# Patient Record
Sex: MALE | Race: WHITE | NOT HISPANIC OR LATINO | URBAN - METROPOLITAN AREA
[De-identification: names, ages, dates, MRNs, and addresses within clinical notes are randomized per-mention and may not be internally consistent; named-entity substitution may affect disease eponyms.]

---

## 2023-01-26 PROBLEM — M54.12 RIGHT CERVICAL RADICULOPATHY: Status: ACTIVE | Noted: 2023-01-26

## 2023-01-26 PROBLEM — E78.5 HYPERLIPIDEMIA: Status: ACTIVE | Noted: 2023-01-26

## 2023-01-26 PROBLEM — R03.0 ELEVATED BLOOD PRESSURE READING: Status: ACTIVE | Noted: 2023-01-26

## 2023-01-26 PROBLEM — G47.00 INSOMNIA: Status: ACTIVE | Noted: 2023-01-26

## 2023-01-26 PROBLEM — R53.83 LACK OF ENERGY: Status: ACTIVE | Noted: 2023-01-26

## 2023-01-26 PROBLEM — L65.9 THINNING HAIR: Status: ACTIVE | Noted: 2023-01-26

## 2023-01-26 PROBLEM — E55.9 HYPOVITAMINOSIS D: Status: ACTIVE | Noted: 2023-01-26

## 2023-01-26 PROBLEM — L70.0 ACNE VULGARIS: Status: ACTIVE | Noted: 2023-01-26

## 2023-01-26 PROBLEM — S40.011A CONTUSION OF SHOULDER, RIGHT: Status: ACTIVE | Noted: 2023-01-26

## 2023-01-26 PROBLEM — F41.9 ANXIETY: Status: ACTIVE | Noted: 2023-01-26

## 2023-01-26 PROBLEM — M25.511 SHOULDER PAIN, RIGHT: Status: ACTIVE | Noted: 2023-01-26

## 2023-01-26 RX ORDER — METHYLPREDNISOLONE 4 MG/1
TABLET ORAL
COMMUNITY
Start: 2022-08-08 | End: 2023-03-10

## 2023-01-26 RX ORDER — BACLOFEN 10 MG/1
10 TABLET ORAL 2 TIMES DAILY
COMMUNITY
Start: 2022-08-04 | End: 2023-03-10

## 2023-01-26 RX ORDER — FINASTERIDE 1 MG/1
1 TABLET, FILM COATED ORAL DAILY
COMMUNITY
Start: 2022-07-15 | End: 2023-08-18

## 2023-01-26 RX ORDER — TRAZODONE HYDROCHLORIDE 50 MG/1
50 TABLET ORAL NIGHTLY PRN
COMMUNITY
Start: 2021-07-22 | End: 2023-03-10

## 2023-01-26 RX ORDER — METHOCARBAMOL 500 MG/1
TABLET, FILM COATED ORAL DAILY PRN
COMMUNITY
Start: 2022-08-08 | End: 2023-03-10

## 2023-01-26 RX ORDER — ATORVASTATIN CALCIUM 20 MG/1
20 TABLET, FILM COATED ORAL NIGHTLY
COMMUNITY
Start: 2022-07-27 | End: 2023-03-10 | Stop reason: SDUPTHER

## 2023-03-10 ENCOUNTER — OFFICE VISIT (OUTPATIENT)
Dept: PRIMARY CARE | Facility: CLINIC | Age: 38
End: 2023-03-10
Payer: COMMERCIAL

## 2023-03-10 VITALS
WEIGHT: 234 LBS | OXYGEN SATURATION: 98 % | TEMPERATURE: 97.4 F | SYSTOLIC BLOOD PRESSURE: 118 MMHG | DIASTOLIC BLOOD PRESSURE: 84 MMHG | BODY MASS INDEX: 31.01 KG/M2 | HEART RATE: 80 BPM | RESPIRATION RATE: 18 BRPM | HEIGHT: 73 IN

## 2023-03-10 DIAGNOSIS — G47.00 INSOMNIA, UNSPECIFIED TYPE: ICD-10-CM

## 2023-03-10 DIAGNOSIS — F41.9 ANXIETY: ICD-10-CM

## 2023-03-10 DIAGNOSIS — E78.5 HYPERLIPIDEMIA, UNSPECIFIED HYPERLIPIDEMIA TYPE: ICD-10-CM

## 2023-03-10 DIAGNOSIS — Z00.00 HEALTHCARE MAINTENANCE: Primary | ICD-10-CM

## 2023-03-10 LAB
POC APPEARANCE, URINE: CLEAR
POC BILIRUBIN, URINE: NEGATIVE
POC BLOOD, URINE: NEGATIVE
POC COLOR, URINE: YELLOW
POC GLUCOSE, URINE: NEGATIVE MG/DL
POC KETONES, URINE: NEGATIVE MG/DL
POC LEUKOCYTES, URINE: NEGATIVE
POC NITRITE,URINE: NEGATIVE
POC PH, URINE: 6 PH
POC PROTEIN, URINE: NEGATIVE MG/DL
POC SPECIFIC GRAVITY, URINE: 1.01
POC UROBILINOGEN, URINE: 0.2 EU/DL

## 2023-03-10 PROCEDURE — 99395 PREV VISIT EST AGE 18-39: CPT | Performed by: FAMILY MEDICINE

## 2023-03-10 PROCEDURE — 99214 OFFICE O/P EST MOD 30 MIN: CPT | Performed by: FAMILY MEDICINE

## 2023-03-10 PROCEDURE — 1036F TOBACCO NON-USER: CPT | Performed by: FAMILY MEDICINE

## 2023-03-10 PROCEDURE — 81002 URINALYSIS NONAUTO W/O SCOPE: CPT | Performed by: FAMILY MEDICINE

## 2023-03-10 RX ORDER — ZOLPIDEM TARTRATE 6.25 MG/1
6.25 TABLET, FILM COATED, EXTENDED RELEASE ORAL NIGHTLY PRN
Qty: 30 TABLET | Refills: 0 | Status: SHIPPED | OUTPATIENT
Start: 2023-03-10 | End: 2024-03-11 | Stop reason: SDUPTHER

## 2023-03-10 RX ORDER — BUSPIRONE HYDROCHLORIDE 5 MG/1
TABLET ORAL
Qty: 60 TABLET | Refills: 0 | Status: SHIPPED | OUTPATIENT
Start: 2023-03-10 | End: 2023-04-03

## 2023-03-10 RX ORDER — ATORVASTATIN CALCIUM 20 MG/1
20 TABLET, FILM COATED ORAL NIGHTLY
Qty: 90 TABLET | Refills: 3 | Status: SHIPPED | OUTPATIENT
Start: 2023-03-10 | End: 2023-07-25

## 2023-03-10 ASSESSMENT — ENCOUNTER SYMPTOMS
HEADACHES: 0
SHORTNESS OF BREATH: 0

## 2023-03-10 NOTE — PROGRESS NOTES
"Subjective     Ravi Subramanian is a 37 y.o. male who presents for Annual Exam (Pt. In for a complete physical.).    HPI     Pt feels well.  He had recent cmp and lipids done a few months ago, lipids improved on statin therapy.  No side effects to statin.  He does have insomnia and failed trazodone.  He is requesting ambien which has helped in the past.  He only needs it 1-2 times a month.  He also reports situational anxiety , tried hydroxyzine which was ineffective.  Patient denies chest pain, shortness of breath, dizziness, headaches or vision changes.      Review of Systems   Respiratory:  Negative for shortness of breath.    Cardiovascular:  Negative for chest pain.   Neurological:  Negative for headaches.       Objective   /84 (BP Location: Right arm, Patient Position: Sitting)   Pulse 80   Temp 36.3 °C (97.4 °F) (Temporal)   Resp 18   Ht 1.854 m (6' 1\")   Wt 106 kg (234 lb)   SpO2 98%   BMI 30.87 kg/m²     Past Surgical History:   Procedure Laterality Date    HERNIA REPAIR  07/22/2021    Hernia Repair    WISDOM TOOTH EXTRACTION          Social History     Socioeconomic History    Marital status: Single     Spouse name: Not on file    Number of children: 0    Years of education: Not on file    Highest education level: Not on file   Occupational History    Occupation:    Tobacco Use    Smoking status: Never     Passive exposure: Never    Smokeless tobacco: Never   Vaping Use    Vaping status: Never Used     Passive vaping exposure: Yes   Substance and Sexual Activity    Alcohol use: Yes     Alcohol/week: 1.0 standard drink of alcohol     Types: 1 Standard drinks or equivalent per week    Drug use: Never    Sexual activity: Not on file   Other Topics Concern    Not on file   Social History Narrative    Not on file     Social Determinants of Health     Financial Resource Strain: Not on file   Food Insecurity: Not on file   Transportation Needs: Not on file   Physical Activity: Not on file "   Stress: Not on file   Social Connections: Not on file   Intimate Partner Violence: Not on file   Housing Stability: Not on file        Family History   Problem Relation Name Age of Onset    Arthritis Mother      Sleep apnea Father      Anxiety disorder Brother      Anxiety disorder Maternal Grandmother      Prostate cancer Maternal Grandfather      COPD Other grandmother         Immunization History   Administered Date(s) Administered    Influenza, seasonal, injectable 11/08/2021    Moderna SARS-CoV-2 Vaccination 11/11/2021    Pfizer Purple Cap SARS-CoV-2 03/18/2021, 04/09/2021    Pfizer Sars-cov-2 Bivalent 30 mcg/0.3 mL 09/07/2022    Tdap 07/22/2021        Physical Exam  Vitals reviewed.   Constitutional:       General: He is not in acute distress.     Appearance: Normal appearance.   HENT:      Head: Normocephalic and atraumatic.      Right Ear: Tympanic membrane and ear canal normal.      Left Ear: Tympanic membrane and ear canal normal.      Nose: Nose normal.      Mouth/Throat:      Mouth: Mucous membranes are moist.      Pharynx: Oropharynx is clear. No oropharyngeal exudate or posterior oropharyngeal erythema.   Eyes:      Extraocular Movements: Extraocular movements intact.      Conjunctiva/sclera: Conjunctivae normal.   Cardiovascular:      Rate and Rhythm: Normal rate and regular rhythm.      Heart sounds: No murmur heard.  Pulmonary:      Effort: Pulmonary effort is normal. No respiratory distress.      Breath sounds: Normal breath sounds. No wheezing, rhonchi or rales.   Abdominal:      General: Abdomen is flat. There is no distension.      Palpations: Abdomen is soft.      Tenderness: There is no abdominal tenderness.   Genitourinary:     Testes: Normal.   Musculoskeletal:         General: Normal range of motion.      Cervical back: Normal range of motion and neck supple.   Lymphadenopathy:      Cervical: No cervical adenopathy.   Skin:     General: Skin is warm and dry.   Neurological:      General:  No focal deficit present.      Mental Status: He is alert and oriented to person, place, and time. Mental status is at baseline.   Psychiatric:         Mood and Affect: Mood normal.         Behavior: Behavior normal.         Problem List Items Addressed This Visit       Anxiety    Hyperlipidemia    Relevant Medications    atorvastatin (Lipitor) 20 mg tablet    Other Relevant Orders    Lipid Panel    Hemoglobin A1C    Insomnia     Other Visit Diagnoses       Healthcare maintenance    -  Primary    Relevant Orders    POCT UA (nonautomated w/o microscopy) manually resulted (Completed)    Comprehensive Metabolic Panel    Lipid Panel    Hemoglobin A1C    CBC            Assessment/Plan     Well exam     HLD - controlled on statin therapy    Reviewed lipids     Insomnia - failed trazodone, pt has tried ambien which has been helpflul in the past, will rx short dose as needed, pt only has trouble sleeping 1-2 times a month    I have personally reviewed the OARRS report today for this patient and it is consistent with the prescribed therapy.  We weighed the risks and benefits of this therapy.  I have considered the risk of abuse, dependence, addiction, and diversion.  I will continue with the current treatment plan     Anxiety - consider buspar 5 mg BID as needed    Follow up in 1-2 months

## 2023-04-01 DIAGNOSIS — F41.9 ANXIETY: ICD-10-CM

## 2023-04-03 RX ORDER — BUSPIRONE HYDROCHLORIDE 5 MG/1
TABLET ORAL
Qty: 60 TABLET | Refills: 3 | Status: SHIPPED | OUTPATIENT
Start: 2023-04-03 | End: 2023-06-26

## 2023-06-24 DIAGNOSIS — F41.9 ANXIETY: ICD-10-CM

## 2023-06-26 RX ORDER — BUSPIRONE HYDROCHLORIDE 5 MG/1
TABLET ORAL
Qty: 180 TABLET | Refills: 0 | Status: SHIPPED | OUTPATIENT
Start: 2023-06-26 | End: 2023-07-25

## 2023-07-20 ENCOUNTER — LAB (OUTPATIENT)
Dept: LAB | Facility: LAB | Age: 38
End: 2023-07-20
Payer: COMMERCIAL

## 2023-07-20 DIAGNOSIS — Z00.00 HEALTHCARE MAINTENANCE: ICD-10-CM

## 2023-07-20 DIAGNOSIS — E78.5 HYPERLIPIDEMIA, UNSPECIFIED HYPERLIPIDEMIA TYPE: ICD-10-CM

## 2023-07-20 LAB
ALANINE AMINOTRANSFERASE (SGPT) (U/L) IN SER/PLAS: 14 U/L (ref 10–52)
ALBUMIN (G/DL) IN SER/PLAS: 4.7 G/DL (ref 3.4–5)
ALKALINE PHOSPHATASE (U/L) IN SER/PLAS: 68 U/L (ref 33–120)
ANION GAP IN SER/PLAS: 12 MMOL/L (ref 10–20)
ASPARTATE AMINOTRANSFERASE (SGOT) (U/L) IN SER/PLAS: 17 U/L (ref 9–39)
BILIRUBIN TOTAL (MG/DL) IN SER/PLAS: 0.8 MG/DL (ref 0–1.2)
CALCIUM (MG/DL) IN SER/PLAS: 10.4 MG/DL (ref 8.6–10.3)
CARBON DIOXIDE, TOTAL (MMOL/L) IN SER/PLAS: 31 MMOL/L (ref 21–32)
CHLORIDE (MMOL/L) IN SER/PLAS: 101 MMOL/L (ref 98–107)
CHOLESTEROL (MG/DL) IN SER/PLAS: 172 MG/DL (ref 0–199)
CHOLESTEROL IN HDL (MG/DL) IN SER/PLAS: 43.1 MG/DL
CHOLESTEROL/HDL RATIO: 4
CREATININE (MG/DL) IN SER/PLAS: 1.39 MG/DL (ref 0.5–1.3)
ERYTHROCYTE DISTRIBUTION WIDTH (RATIO) BY AUTOMATED COUNT: 11.9 % (ref 11.5–14.5)
ERYTHROCYTE MEAN CORPUSCULAR HEMOGLOBIN CONCENTRATION (G/DL) BY AUTOMATED: 32.6 G/DL (ref 32–36)
ERYTHROCYTE MEAN CORPUSCULAR VOLUME (FL) BY AUTOMATED COUNT: 94 FL (ref 80–100)
ERYTHROCYTES (10*6/UL) IN BLOOD BY AUTOMATED COUNT: 5.04 X10E12/L (ref 4.5–5.9)
ESTIMATED AVERAGE GLUCOSE FOR HBA1C: 100 MG/DL
GFR MALE: 67 ML/MIN/1.73M2
GLUCOSE (MG/DL) IN SER/PLAS: 89 MG/DL (ref 74–99)
HEMATOCRIT (%) IN BLOOD BY AUTOMATED COUNT: 47.5 % (ref 41–52)
HEMOGLOBIN (G/DL) IN BLOOD: 15.5 G/DL (ref 13.5–17.5)
HEMOGLOBIN A1C/HEMOGLOBIN TOTAL IN BLOOD: 5.1 %
LDL: 113 MG/DL (ref 0–99)
LEUKOCYTES (10*3/UL) IN BLOOD BY AUTOMATED COUNT: 7.2 X10E9/L (ref 4.4–11.3)
PLATELETS (10*3/UL) IN BLOOD AUTOMATED COUNT: 310 X10E9/L (ref 150–450)
POTASSIUM (MMOL/L) IN SER/PLAS: 5 MMOL/L (ref 3.5–5.3)
PROTEIN TOTAL: 7.2 G/DL (ref 6.4–8.2)
SODIUM (MMOL/L) IN SER/PLAS: 139 MMOL/L (ref 136–145)
TRIGLYCERIDE (MG/DL) IN SER/PLAS: 78 MG/DL (ref 0–149)
UREA NITROGEN (MG/DL) IN SER/PLAS: 16 MG/DL (ref 6–23)
VLDL: 16 MG/DL (ref 0–40)

## 2023-07-20 PROCEDURE — 80053 COMPREHEN METABOLIC PANEL: CPT

## 2023-07-20 PROCEDURE — 36415 COLL VENOUS BLD VENIPUNCTURE: CPT

## 2023-07-20 PROCEDURE — 83036 HEMOGLOBIN GLYCOSYLATED A1C: CPT

## 2023-07-20 PROCEDURE — 80061 LIPID PANEL: CPT

## 2023-07-20 PROCEDURE — 85027 COMPLETE CBC AUTOMATED: CPT

## 2023-07-25 ENCOUNTER — OFFICE VISIT (OUTPATIENT)
Dept: PRIMARY CARE | Facility: CLINIC | Age: 38
End: 2023-07-25
Payer: COMMERCIAL

## 2023-07-25 VITALS
SYSTOLIC BLOOD PRESSURE: 124 MMHG | TEMPERATURE: 97.9 F | DIASTOLIC BLOOD PRESSURE: 84 MMHG | RESPIRATION RATE: 18 BRPM | OXYGEN SATURATION: 98 % | BODY MASS INDEX: 28.23 KG/M2 | WEIGHT: 213 LBS | HEIGHT: 73 IN | HEART RATE: 77 BPM

## 2023-07-25 DIAGNOSIS — R79.89 ELEVATED SERUM CREATININE: ICD-10-CM

## 2023-07-25 DIAGNOSIS — F41.9 ANXIETY: ICD-10-CM

## 2023-07-25 DIAGNOSIS — E78.5 HYPERLIPIDEMIA, UNSPECIFIED HYPERLIPIDEMIA TYPE: Primary | ICD-10-CM

## 2023-07-25 DIAGNOSIS — G47.00 INSOMNIA, UNSPECIFIED TYPE: ICD-10-CM

## 2023-07-25 DIAGNOSIS — L70.9 ACNE, UNSPECIFIED ACNE TYPE: ICD-10-CM

## 2023-07-25 DIAGNOSIS — F90.0 ATTENTION DEFICIT HYPERACTIVITY DISORDER (ADHD), PREDOMINANTLY INATTENTIVE TYPE: ICD-10-CM

## 2023-07-25 PROCEDURE — 99214 OFFICE O/P EST MOD 30 MIN: CPT | Performed by: FAMILY MEDICINE

## 2023-07-25 PROCEDURE — 1036F TOBACCO NON-USER: CPT | Performed by: FAMILY MEDICINE

## 2023-07-25 RX ORDER — DEXTROAMPHETAMINE SACCHARATE, AMPHETAMINE ASPARTATE, DEXTROAMPHETAMINE SULFATE AND AMPHETAMINE SULFATE 1.25; 1.25; 1.25; 1.25 MG/1; MG/1; MG/1; MG/1
1 TABLET ORAL 2 TIMES DAILY
Qty: 60 TABLET | Refills: 0 | COMMUNITY
Start: 2023-07-09 | End: 2023-11-10 | Stop reason: ALTCHOICE

## 2023-07-25 RX ORDER — DEXTROAMPHETAMINE SACCHARATE, AMPHETAMINE ASPARTATE, DEXTROAMPHETAMINE SULFATE AND AMPHETAMINE SULFATE 7.5; 7.5; 7.5; 7.5 MG/1; MG/1; MG/1; MG/1
1 TABLET ORAL 2 TIMES DAILY
Qty: 60 TABLET | Refills: 0 | COMMUNITY
Start: 2023-07-09 | End: 2023-08-08

## 2023-07-25 RX ORDER — MINOCYCLINE HYDROCHLORIDE 100 MG/1
100 CAPSULE ORAL 2 TIMES DAILY
Qty: 60 CAPSULE | Refills: 1 | Status: SHIPPED | OUTPATIENT
Start: 2023-07-25 | End: 2023-10-17

## 2023-07-25 RX ORDER — ATORVASTATIN CALCIUM 40 MG/1
40 TABLET, FILM COATED ORAL DAILY
Qty: 90 TABLET | Refills: 1 | Status: SHIPPED | OUTPATIENT
Start: 2023-07-25 | End: 2024-01-18

## 2023-07-25 ASSESSMENT — ENCOUNTER SYMPTOMS
HEADACHES: 0
SHORTNESS OF BREATH: 0

## 2023-07-25 NOTE — PROGRESS NOTES
"Subjective     Ravi Subramanian is a 38 y.o. male who presents for Anxiety.    Anxiety  Patient reports no chest pain or shortness of breath.          Pt has HLD, on atorvastatin 20 mg daily.  He had recent labs done.     He has anxiety and tried buspar 5 mg BID but didn't take it long. He didn't feel it helped.      His creatinine was elevated on his recent labwork.  He does admit to taking creatine prior to workouts.  He also admits that he doesn't drink enough fluids.      Hx of ADHD, diagnosed by psychiatry (telehealth) - on adderall 20 mg and adderall 5 mg BID.  He is doing well.     Pt also reports recent acne flare up on face.  He is requesting treatment. He has never tried tetracycline antibiotics.      Review of Systems   Respiratory:  Negative for shortness of breath.    Cardiovascular:  Negative for chest pain.   Neurological:  Negative for headaches.       Objective     Vitals:    07/25/23 0828   BP: 124/84   BP Location: Right arm   Patient Position: Sitting   Pulse: 77   Resp: 18   Temp: 36.6 °C (97.9 °F)   TempSrc: Temporal   SpO2: 98%   Weight: 96.6 kg (213 lb)   Height: 1.854 m (6' 1\")        Current Outpatient Medications   Medication Instructions    amphetamine-dextroamphetamine (Adderall) 20 mg tablet 1 tablet, oral, 2 times daily    amphetamine-dextroamphetamine (Adderall) 5 mg tablet 1 tablet, oral, 2 times daily    atorvastatin (LIPITOR) 40 mg, oral, Daily    finasteride (Propecia) 1 mg tablet 1 tablet, oral, Daily    minocycline 100 mg, oral, 2 times daily    zolpidem CR (AMBIEN CR) 6.25 mg, oral, Nightly PRN, Do not crush, chew, or split.        Past Surgical History:   Procedure Laterality Date    HERNIA REPAIR  07/22/2021    Hernia Repair    WISDOM TOOTH EXTRACTION          Social History     Tobacco Use    Smoking status: Never     Passive exposure: Never    Smokeless tobacco: Never   Vaping Use    Vaping Use: Never used   Substance Use Topics    Alcohol use: Yes     Alcohol/week: 1.0 " standard drink of alcohol     Types: 1 Standard drinks or equivalent per week    Drug use: Never        Family History   Problem Relation Name Age of Onset    Arthritis Mother      Sleep apnea Father      Anxiety disorder Brother      Anxiety disorder Maternal Grandmother      Prostate cancer Maternal Grandfather      COPD Other grandmother         Immunization History   Administered Date(s) Administered    Influenza, injectable, MDCK, preservative free, quadrivalent 11/08/2021    Influenza, injectable, quadrivalent, preservative free 11/13/2020, 10/19/2022    Influenza, seasonal, injectable 11/08/2021    Moderna SARS-CoV-2 Vaccination 11/11/2021    Pfizer Purple Cap SARS-CoV-2 03/18/2021, 04/09/2021    Pfizer Sars-cov-2 Bivalent 30 mcg/0.3 mL 09/07/2022    Tdap 07/22/2021        Physical Exam  Vitals reviewed.   Constitutional:       General: He is not in acute distress.     Appearance: Normal appearance. He is well-developed.   HENT:      Head: Normocephalic and atraumatic.      Nose: Nose normal.   Eyes:      General: Lids are normal.      Extraocular Movements: Extraocular movements intact.      Conjunctiva/sclera: Conjunctivae normal.      Right eye: Right conjunctiva is not injected.      Left eye: Left conjunctiva is not injected.   Cardiovascular:      Rate and Rhythm: Normal rate and regular rhythm.      Heart sounds: No murmur heard.  Pulmonary:      Effort: Pulmonary effort is normal. No respiratory distress.      Breath sounds: Normal breath sounds. No wheezing, rhonchi or rales.   Lymphadenopathy:      Cervical: No cervical adenopathy.   Skin:     General: Skin is warm and dry.      Findings: Acne (lower chin region) present. No rash.   Neurological:      Mental Status: He is alert and oriented to person, place, and time. Mental status is at baseline.   Psychiatric:         Mood and Affect: Mood normal.         Behavior: Behavior normal.         Problem List Items Addressed This Visit       Anxiety     Hyperlipidemia - Primary    Relevant Medications    atorvastatin (Lipitor) 40 mg tablet    Other Relevant Orders    Lipid Panel    Insomnia    Attention deficit hyperactivity disorder (ADHD), predominantly inattentive type     Other Visit Diagnoses       Elevated serum creatinine        Relevant Orders    Basic Metabolic Panel    Acne, unspecified acne type        Relevant Medications    minocycline 100 mg capsule            Assessment/Plan     Anxiety - failed  buspar , anxiety levels controlled without medication.     Insomnia - controlled  - ambien as needed.      HLD - not adequately controlled, increase to 40 mg atorvastatin daily, recheck lipids in 3 months     Elevated creatinine - recheck bmp in 1 month , stop using creatine.     ADHD - on adderall through telehealth psychiatry    [facility] reviewed    Acne - face - chin region - will prescribe minocycline 100 mg BID x 4 weeks , then as needed for flare ups     Follow up 6 months or sooner if needed

## 2023-08-18 DIAGNOSIS — L65.9 NONSCARRING HAIR LOSS, UNSPECIFIED: ICD-10-CM

## 2023-08-18 RX ORDER — FINASTERIDE 1 MG/1
1 TABLET, FILM COATED ORAL DAILY
Qty: 90 TABLET | Refills: 3 | Status: SHIPPED | OUTPATIENT
Start: 2023-08-18

## 2023-09-14 ENCOUNTER — LAB (OUTPATIENT)
Dept: LAB | Facility: LAB | Age: 38
End: 2023-09-14
Payer: COMMERCIAL

## 2023-09-14 DIAGNOSIS — R79.89 ELEVATED SERUM CREATININE: ICD-10-CM

## 2023-09-14 DIAGNOSIS — E78.5 HYPERLIPIDEMIA, UNSPECIFIED HYPERLIPIDEMIA TYPE: ICD-10-CM

## 2023-09-14 DIAGNOSIS — E78.5 HYPERLIPIDEMIA, UNSPECIFIED HYPERLIPIDEMIA TYPE: Primary | ICD-10-CM

## 2023-09-14 LAB
ANION GAP IN SER/PLAS: 13 MMOL/L (ref 10–20)
CALCIUM (MG/DL) IN SER/PLAS: 10 MG/DL (ref 8.6–10.6)
CARBON DIOXIDE, TOTAL (MMOL/L) IN SER/PLAS: 29 MMOL/L (ref 21–32)
CHLORIDE (MMOL/L) IN SER/PLAS: 104 MMOL/L (ref 98–107)
CHOLESTEROL (MG/DL) IN SER/PLAS: 155 MG/DL (ref 0–199)
CHOLESTEROL IN HDL (MG/DL) IN SER/PLAS: 52.2 MG/DL
CHOLESTEROL/HDL RATIO: 3
CREATININE (MG/DL) IN SER/PLAS: 1.28 MG/DL (ref 0.5–1.3)
GFR MALE: 74 ML/MIN/1.73M2
GLUCOSE (MG/DL) IN SER/PLAS: 95 MG/DL (ref 74–99)
LDL: 90 MG/DL (ref 0–99)
POTASSIUM (MMOL/L) IN SER/PLAS: 4.7 MMOL/L (ref 3.5–5.3)
SODIUM (MMOL/L) IN SER/PLAS: 141 MMOL/L (ref 136–145)
TRIGLYCERIDE (MG/DL) IN SER/PLAS: 66 MG/DL (ref 0–149)
UREA NITROGEN (MG/DL) IN SER/PLAS: 19 MG/DL (ref 6–23)
VLDL: 13 MG/DL (ref 0–40)

## 2023-09-14 PROCEDURE — 80048 BASIC METABOLIC PNL TOTAL CA: CPT

## 2023-09-14 PROCEDURE — 36415 COLL VENOUS BLD VENIPUNCTURE: CPT

## 2023-09-14 PROCEDURE — 80061 LIPID PANEL: CPT

## 2023-10-16 DIAGNOSIS — L70.9 ACNE, UNSPECIFIED ACNE TYPE: ICD-10-CM

## 2023-10-17 ENCOUNTER — OFFICE VISIT (OUTPATIENT)
Dept: PRIMARY CARE | Facility: CLINIC | Age: 38
End: 2023-10-17
Payer: COMMERCIAL

## 2023-10-17 VITALS
SYSTOLIC BLOOD PRESSURE: 124 MMHG | OXYGEN SATURATION: 97 % | WEIGHT: 226 LBS | TEMPERATURE: 97.7 F | HEIGHT: 73 IN | HEART RATE: 74 BPM | RESPIRATION RATE: 18 BRPM | BODY MASS INDEX: 29.95 KG/M2 | DIASTOLIC BLOOD PRESSURE: 82 MMHG

## 2023-10-17 DIAGNOSIS — M25.512 LEFT SHOULDER PAIN, UNSPECIFIED CHRONICITY: Primary | ICD-10-CM

## 2023-10-17 PROCEDURE — 99213 OFFICE O/P EST LOW 20 MIN: CPT | Performed by: FAMILY MEDICINE

## 2023-10-17 PROCEDURE — 1036F TOBACCO NON-USER: CPT | Performed by: FAMILY MEDICINE

## 2023-10-17 RX ORDER — MINOCYCLINE HYDROCHLORIDE 100 MG/1
100 CAPSULE ORAL 2 TIMES DAILY
Qty: 60 CAPSULE | Refills: 0 | Status: SHIPPED | OUTPATIENT
Start: 2023-10-17 | End: 2023-11-20

## 2023-10-17 NOTE — PROGRESS NOTES
"Subjective     Ravi Subramanian is a 38 y.o. male who presents for Left Shoulder Pain (Per patient, he believes he injured his left shoulder weight lifting. Patient states the pain has increased over the last few months. ).    HPI     Left anterior shoulder pain x 2 months, unclear injury.  Pain with lateral raise/abduction or overhead activity with bearing weight.  No known hx of left shoulder injury.  No hand or arm numbness or tingling.  No weakness of left arm/shoulder.          Objective     Vitals:    10/17/23 1448   BP: 124/82   BP Location: Left arm   Patient Position: Sitting   Pulse: 74   Resp: 18   Temp: 36.5 °C (97.7 °F)   TempSrc: Temporal   SpO2: 97%   Weight: 103 kg (226 lb)   Height: 1.854 m (6' 1\")        Current Outpatient Medications   Medication Instructions    amphetamine-dextroamphetamine (Adderall) 30 mg tablet 1 tablet, oral, 2 times daily    amphetamine-dextroamphetamine (Adderall) 5 mg tablet 1 tablet, oral, 2 times daily    atorvastatin (LIPITOR) 40 mg, oral, Daily    finasteride (PROPECIA) 1 mg, oral, Daily    minocycline 100 mg, oral, 2 times daily    zolpidem CR (AMBIEN CR) 6.25 mg, oral, Nightly PRN, Do not crush, chew, or split.        Past Surgical History:   Procedure Laterality Date    HERNIA REPAIR  07/22/2021    Hernia Repair    WISDOM TOOTH EXTRACTION          Social History     Tobacco Use    Smoking status: Never     Passive exposure: Never    Smokeless tobacco: Never   Vaping Use    Vaping Use: Never used   Substance Use Topics    Alcohol use: Yes     Alcohol/week: 1.0 standard drink of alcohol     Types: 1 Standard drinks or equivalent per week    Drug use: Never        Family History   Problem Relation Name Age of Onset    Arthritis Mother      Sleep apnea Father      Anxiety disorder Brother      Anxiety disorder Maternal Grandmother      Prostate cancer Maternal Grandfather      COPD Other grandmother         Immunization History   Administered Date(s) Administered    Flu " vaccine (IIV4), preservative free *Check age/dose* 11/13/2020, 10/19/2022    Influenza, injectable, MDCK, preservative free, quadrivalent 11/08/2021    Influenza, seasonal, injectable 11/08/2021    Moderna SARS-CoV-2 Vaccination 11/11/2021    Pfizer Purple Cap SARS-CoV-2 03/18/2021, 04/09/2021    Tdap vaccine, age 7 year and older (BOOSTRIX) 07/22/2021        Physical Exam  Vitals reviewed.   Constitutional:       General: He is not in acute distress.     Appearance: Normal appearance. He is well-developed.   HENT:      Head: Normocephalic and atraumatic.   Eyes:      General: Lids are normal.      Conjunctiva/sclera:      Right eye: Right conjunctiva is not injected.      Left eye: Left conjunctiva is not injected.   Cardiovascular:      Rate and Rhythm: Normal rate and regular rhythm.      Heart sounds: No murmur heard.  Pulmonary:      Effort: Pulmonary effort is normal. No respiratory distress.      Breath sounds: Normal breath sounds. No wheezing, rhonchi or rales.   Musculoskeletal:      Right shoulder: Normal.      Left shoulder: Tenderness (over anterior deltoid) present. No swelling, deformity or effusion. Decreased range of motion (abduction, internal rotation).   Skin:     General: Skin is warm and dry.      Findings: No rash.   Neurological:      Mental Status: He is alert and oriented to person, place, and time.   Psychiatric:         Mood and Affect: Mood normal.         Behavior: Behavior normal.         Problem List Items Addressed This Visit    None  Visit Diagnoses       Left shoulder pain, unspecified chronicity    -  Primary    Relevant Orders    Referral to Physical Therapy    Referral to Sports Medicine            Assessment/Plan     Shoulder pain -left - x 2 months - possible deltoid vs rotator cuff strain, see PT and sports med, referred    Follow up if no improvement or if symptoms worsen.

## 2023-11-03 ENCOUNTER — HOSPITAL ENCOUNTER (OUTPATIENT)
Dept: RADIOLOGY | Facility: HOSPITAL | Age: 38
Discharge: HOME | End: 2023-11-03
Payer: COMMERCIAL

## 2023-11-03 ENCOUNTER — OFFICE VISIT (OUTPATIENT)
Dept: ORTHOPEDIC SURGERY | Facility: HOSPITAL | Age: 38
End: 2023-11-03
Payer: COMMERCIAL

## 2023-11-03 VITALS — WEIGHT: 220 LBS | BODY MASS INDEX: 29.16 KG/M2 | HEIGHT: 73 IN

## 2023-11-03 DIAGNOSIS — M25.512 LEFT SHOULDER PAIN, UNSPECIFIED CHRONICITY: ICD-10-CM

## 2023-11-03 DIAGNOSIS — M25.512 LEFT SHOULDER PAIN, UNSPECIFIED CHRONICITY: Primary | ICD-10-CM

## 2023-11-03 PROCEDURE — 99213 OFFICE O/P EST LOW 20 MIN: CPT | Performed by: STUDENT IN AN ORGANIZED HEALTH CARE EDUCATION/TRAINING PROGRAM

## 2023-11-03 PROCEDURE — 99213 OFFICE O/P EST LOW 20 MIN: CPT | Mod: 25 | Performed by: STUDENT IN AN ORGANIZED HEALTH CARE EDUCATION/TRAINING PROGRAM

## 2023-11-03 PROCEDURE — 73030 X-RAY EXAM OF SHOULDER: CPT | Mod: LEFT SIDE | Performed by: RADIOLOGY

## 2023-11-03 PROCEDURE — 73030 X-RAY EXAM OF SHOULDER: CPT | Mod: LT,FY

## 2023-11-03 PROCEDURE — 1036F TOBACCO NON-USER: CPT | Performed by: STUDENT IN AN ORGANIZED HEALTH CARE EDUCATION/TRAINING PROGRAM

## 2023-11-03 ASSESSMENT — PAIN DESCRIPTION - DESCRIPTORS: DESCRIPTORS: SHOOTING

## 2023-11-03 ASSESSMENT — PAIN - FUNCTIONAL ASSESSMENT: PAIN_FUNCTIONAL_ASSESSMENT: 0-10

## 2023-11-03 ASSESSMENT — PAIN SCALES - GENERAL: PAINLEVEL_OUTOF10: 1

## 2023-11-05 NOTE — PROGRESS NOTES
PRIMARY CARE PHYSICIAN: Liban Tapia DO  REFERRING PROVIDER: Liban Tapia DO  [address]  [address]  [place] [address]     CONSULT ORTHOPAEDIC: Shoulder Evaluation    ASSESSMENT & PLAN    Impression/Plan: This is a 38-year-old right-hand-dominant male presenting with clinical history, and physical examination findings consistent with a left proximal biceps tendinitis.  We discussed this finding.  At this time, we will refer him to physical therapy.  In addition, I will refer him to my colleague Dr. Anayeli Maldonado for a left ultrasound-guided biceps injection.  The patient is understanding agreement with this plan.  He will follow-up in 4 to 6 weeks for further evaluation.    SUBJECTIVE  CHIEF COMPLAINT:   Chief Complaint   Patient presents with    Left Shoulder - Pain        HPI: Ravi Subramanian is a 38 y.o. patient. Ravi Subramanian is a right-hand-dominant male presenting for evaluation of left anterior shoulder pain.  He has a history of a right-sided biceps tendinitis that was treated nonoperatively and recovered well.  He is an avid weightlifter with above shoulder activities.  Over the last several months he has noticed increasing anterior left shoulder pain.  He denies any previous surgery or injections to the left shoulder.  Denies any distal numbness or tingling.    REVIEW OF SYSTEMS  Constitutional: See HPI for pain assessment, No significant weight loss, recent trauma  Cardiovascular: No chest pain, shortness of breath  Respiratory: No difficulty breathing, cough  Gastrointestinal: No nausea, vomiting, diarrhea, constipation  Musculoskeletal: Noted in HPI, positive for pain, restricted motion, stiffness  Integumentary: No rashes, easy bruising, redness   Neurological: no numbness or tingling in extremities, no gait disturbances   Psychiatric: No mood changes, memory changes, social issues  Heme/Lymph: no excessive swelling, easy bruising, excessive bleeding  ENT: No hearing changes  Eyes:  No vision changes    Past Medical History:   Diagnosis Date    Other specified health status     No pertinent past medical history        No Known Allergies     Past Surgical History:   Procedure Laterality Date    HERNIA REPAIR  07/22/2021    Hernia Repair    WISDOM TOOTH EXTRACTION          Family History   Problem Relation Name Age of Onset    Arthritis Mother      Sleep apnea Father      Anxiety disorder Brother      Anxiety disorder Maternal Grandmother      Prostate cancer Maternal Grandfather      COPD Other grandmother         Social History     Socioeconomic History    Marital status: Significant Other     Spouse name: Not on file    Number of children: 0    Years of education: Not on file    Highest education level: Not on file   Occupational History    Occupation:    Tobacco Use    Smoking status: Never     Passive exposure: Never    Smokeless tobacco: Never   Vaping Use    Vaping Use: Never used   Substance and Sexual Activity    Alcohol use: Yes     Alcohol/week: 1.0 standard drink of alcohol     Types: 1 Standard drinks or equivalent per week    Drug use: Never    Sexual activity: Not on file   Other Topics Concern    Not on file   Social History Narrative    Not on file     Social Determinants of Health     Financial Resource Strain: Not on file   Food Insecurity: Not on file   Transportation Needs: Not on file   Physical Activity: Not on file   Stress: Not on file   Social Connections: Not on file   Intimate Partner Violence: Not on file   Housing Stability: Not on file        CURRENT MEDICATIONS:   Current Outpatient Medications   Medication Sig Dispense Refill    amphetamine-dextroamphetamine (Adderall) 30 mg tablet Take 1 tablet (30 mg) by mouth 2 times a day. 60 tablet 0    amphetamine-dextroamphetamine (Adderall) 5 mg tablet Take 1 tablet (5 mg) by mouth 2 times a day. 60 tablet 0    atorvastatin (Lipitor) 40 mg tablet Take 1 tablet (40 mg) by mouth once daily. 90 tablet 1     "finasteride (Propecia) 1 mg tablet TAKE 1 TABLET BY MOUTH EVERY DAY (Patient not taking: Reported on 11/3/2023) 90 tablet 3    minocycline 100 mg capsule TAKE 1 CAPSULE BY MOUTH TWICE A DAY 60 capsule 0    zolpidem CR (Ambien CR) 6.25 mg ER tablet Take 1 tablet (6.25 mg) by mouth as needed at bedtime for sleep. Do not crush, chew, or split. 30 tablet 0     No current facility-administered medications for this visit.        OBJECTIVE    PHYSICAL EXAM      7/22/2021    10:12 AM 7/15/2022     1:35 PM 3/10/2023     9:47 AM 7/25/2023     8:28 AM 10/17/2023     2:48 PM 11/3/2023     2:22 PM   Vitals   Systolic 128 122 118 124 124    Diastolic 90 78 84 84 82    Heart Rate 77 78 80 77 74    Temp 36.7 °C (98 °F) 36.2 °C (97.2 °F) 36.3 °C (97.4 °F) 36.6 °C (97.9 °F) 36.5 °C (97.7 °F)    Resp 16 16 18 18 18    Height (in) 1.854 m (6' 1\") 1.854 m (6' 1\") 1.854 m (6' 1\") 1.854 m (6' 1\") 1.854 m (6' 1\") 1.854 m (6' 1\")   Weight (lb) 246 239.4 234 213 226 220   BMI 32.46 kg/m2 31.59 kg/m2 30.87 kg/m2 28.1 kg/m2 29.82 kg/m2 29.03 kg/m2   BSA (m2) 2.4 m2 2.37 m2 2.34 m2 2.23 m2 2.3 m2 2.27 m2   Visit Report   Report Report Report Report      Body mass index is 29.03 kg/m².    GENERAL: A/Ox3, NAD. Appears healthy, well nourished  PSYCHIATRIC: Mood stable, appropriate memory recall  EYES: EOM intact, no scleral icterus  CARDIAC: regular rate  LUNGS: Breathing non-labored  SKIN: no erythema, rashes, or ecchymoses     MUSCULOSKELETAL:  This is a well-appearing patient in no acute distress.    There is no tenderness to palpation along the SC joint, AC joint, clavicle, acromion, or spine of the scapula.  There is significant tenderness along the proximal aspect of the biceps tendon.  Active range of motion: forward flexion 160 degrees, abduction 160 degrees, external rotation 45 degrees, internal rotation to T7.  Strength: 5/5 to the supraspinatus, infraspinatus, teres minor.  Stability: Anterior/Posterior load and shift stable  Positive " Speed's and Yergason's.  Positive [place]'s.  Negative Neer and Dong.  The patient is firing the AIN/PIN/median/radial/ulnar/axillary distributions.  The patient is sensate to light touch in the median/radial/ulnar/axillary distributions.  2+ radial pulse.    NEUROVASCULAR:  - Neurovascular Status: sensation intact to light touch distally, upper extremity motor grossly intact  - Capillary refill brisk at extremities, radial pulse 2+    Imaging: Multiple views of the affected left shoulder(s) demonstrate: Well-preserved glenohumeral joint space.  There is normal acromiohumeral interval.  No evidence of fracture or dislocation.   X-rays were personally reviewed and interpreted by me.  Radiology reports were reviewed by me as well, if readily available at the time.        Jos Kelley MD, MPH  Attending Surgeon  Sports Medicine Orthopaedic Surgery  [facility] [place]

## 2023-11-10 ENCOUNTER — OFFICE VISIT (OUTPATIENT)
Dept: ORTHOPEDIC SURGERY | Facility: CLINIC | Age: 38
End: 2023-11-10
Payer: COMMERCIAL

## 2023-11-10 DIAGNOSIS — M75.22 TENDONITIS OF UPPER BICEPS TENDON OF LEFT SHOULDER: Primary | ICD-10-CM

## 2023-11-10 DIAGNOSIS — M25.512 LEFT SHOULDER PAIN, UNSPECIFIED CHRONICITY: ICD-10-CM

## 2023-11-10 PROCEDURE — 76942 ECHO GUIDE FOR BIOPSY: CPT | Performed by: STUDENT IN AN ORGANIZED HEALTH CARE EDUCATION/TRAINING PROGRAM

## 2023-11-10 PROCEDURE — 2500000004 HC RX 250 GENERAL PHARMACY W/ HCPCS (ALT 636 FOR OP/ED): Performed by: STUDENT IN AN ORGANIZED HEALTH CARE EDUCATION/TRAINING PROGRAM

## 2023-11-10 PROCEDURE — 20551 NJX 1 TENDON ORIGIN/INSJ: CPT | Performed by: STUDENT IN AN ORGANIZED HEALTH CARE EDUCATION/TRAINING PROGRAM

## 2023-11-10 PROCEDURE — 99214 OFFICE O/P EST MOD 30 MIN: CPT | Mod: GC | Performed by: STUDENT IN AN ORGANIZED HEALTH CARE EDUCATION/TRAINING PROGRAM

## 2023-11-10 PROCEDURE — 2500000005 HC RX 250 GENERAL PHARMACY W/O HCPCS: Performed by: STUDENT IN AN ORGANIZED HEALTH CARE EDUCATION/TRAINING PROGRAM

## 2023-11-10 PROCEDURE — 99204 OFFICE O/P NEW MOD 45 MIN: CPT | Performed by: STUDENT IN AN ORGANIZED HEALTH CARE EDUCATION/TRAINING PROGRAM

## 2023-11-10 PROCEDURE — 1036F TOBACCO NON-USER: CPT | Performed by: STUDENT IN AN ORGANIZED HEALTH CARE EDUCATION/TRAINING PROGRAM

## 2023-11-10 PROCEDURE — 20551 NJX 1 TENDON ORIGIN/INSJ: CPT | Mod: LT | Performed by: STUDENT IN AN ORGANIZED HEALTH CARE EDUCATION/TRAINING PROGRAM

## 2023-11-10 RX ORDER — LIDOCAINE HYDROCHLORIDE 10 MG/ML
1 INJECTION, SOLUTION EPIDURAL; INFILTRATION; INTRACAUDAL; PERINEURAL
Status: COMPLETED | OUTPATIENT
Start: 2023-11-10 | End: 2023-11-10

## 2023-11-10 RX ORDER — ROPIVACAINE HYDROCHLORIDE 5 MG/ML
1 INJECTION, SOLUTION EPIDURAL; INFILTRATION; PERINEURAL
Status: COMPLETED | OUTPATIENT
Start: 2023-11-10 | End: 2023-11-10

## 2023-11-10 RX ORDER — METHYLPREDNISOLONE ACETATE 40 MG/ML
40 INJECTION, SUSPENSION INTRA-ARTICULAR; INTRALESIONAL; INTRAMUSCULAR; SOFT TISSUE
Status: COMPLETED | OUTPATIENT
Start: 2023-11-10 | End: 2023-11-10

## 2023-11-10 RX ADMIN — ROPIVACAINE HYDROCHLORIDE 1 ML: 5 INJECTION, SOLUTION EPIDURAL; INFILTRATION; PERINEURAL at 11:15

## 2023-11-10 RX ADMIN — METHYLPREDNISOLONE ACETATE 40 MG: 40 INJECTION, SUSPENSION INTRA-ARTICULAR; INTRALESIONAL; INTRAMUSCULAR; SOFT TISSUE at 11:15

## 2023-11-10 RX ADMIN — LIDOCAINE HYDROCHLORIDE 1 ML: 10 INJECTION, SOLUTION EPIDURAL; INFILTRATION; INTRACAUDAL; PERINEURAL at 11:15

## 2023-11-10 ASSESSMENT — ENCOUNTER SYMPTOMS
NUMBNESS: 0
ACTIVITY CHANGE: 0
NAUSEA: 0
FEVER: 0
WEAKNESS: 0
ARTHRALGIAS: 1
COLOR CHANGE: 0
JOINT SWELLING: 0
FATIGUE: 0

## 2023-11-10 ASSESSMENT — PAIN DESCRIPTION - DESCRIPTORS: DESCRIPTORS: SHARP;SHOOTING

## 2023-11-10 ASSESSMENT — PAIN - FUNCTIONAL ASSESSMENT: PAIN_FUNCTIONAL_ASSESSMENT: 0-10

## 2023-11-10 ASSESSMENT — PAIN SCALES - GENERAL: PAINLEVEL_OUTOF10: 1

## 2023-11-10 NOTE — Clinical Note
Thanks for the referral! We did the US guided left biceps tendon injection, which I think will help.

## 2023-11-10 NOTE — PROGRESS NOTES
REFERRAL SOURCE: No ref. provider found     CHIEF COMPLAINT: left shoulder pain    HISTORY OF PRESENT ILLNESS  Ravi Subramanian is a very pleasant 38 y.o. 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     11/10/23: He complains of 2-3 months of pain in the anterior left shoulder that has progressively worsened. Pain is sharp and doesn't radiate. Pain is 1/10 but can get up to 10/10. Denies injury. Pain is worse with biceps activities. He has tried NSAIDs, rest, and biofreeze. He has a script for PT but has not gone. No previous shoulder injury or surgery.  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    MEDS    Current Outpatient Medications:     amphetamine-dextroamphetamine (Adderall) 30 mg tablet, Take 1 tablet (30 mg) by mouth 2 times a day., Disp: 60 tablet, Rfl: 0    amphetamine-dextroamphetamine (Adderall) 5 mg tablet, Take 1 tablet (5 mg) by mouth 2 times a day., Disp: 60 tablet, Rfl: 0    atorvastatin (Lipitor) 40 mg tablet, Take 1 tablet (40 mg) by mouth once daily., Disp: 90 tablet, Rfl: 1    finasteride (Propecia) 1 mg tablet, TAKE 1 TABLET BY MOUTH EVERY DAY (Patient not taking: Reported on 11/3/2023), Disp: 90 tablet, Rfl: 3    minocycline 100 mg capsule, TAKE 1 CAPSULE BY MOUTH TWICE A DAY, Disp: 60 capsule, Rfl: 0    zolpidem CR (Ambien CR) 6.25 mg ER tablet, Take 1 tablet (6.25 mg) by mouth as needed at bedtime for sleep. Do not crush, chew, or split., Disp: 30 tablet, Rfl: 0    ALLERGIES  No Known Allergies    PAST MEDICAL HISTORY  Past Medical History:   Diagnosis Date    Other specified health status     No pertinent past medical history       PAST SURGICAL HISTORY  Past Surgical History:   Procedure Laterality Date    HERNIA REPAIR  07/22/2021    Hernia Repair    WISDOM TOOTH EXTRACTION         SOCIAL HISTORY   Social History     Socioeconomic History     Marital status: Significant Other     Spouse name: Not on file    Number of children: 0    Years of education: Not on file    Highest education level: Not on file   Occupational History    Occupation:    Tobacco Use    Smoking status: Never     Passive exposure: Never    Smokeless tobacco: Never   Vaping Use    Vaping Use: Never used   Substance and Sexual Activity    Alcohol use: Yes     Alcohol/week: 1.0 standard drink of alcohol     Types: 1 Standard drinks or equivalent per week    Drug use: Never    Sexual activity: Not on file   Other Topics Concern    Not on file   Social History Narrative    Not on file     Social Determinants of Health     Financial Resource Strain: Not on file   Food Insecurity: Not on file   Transportation Needs: Not on file   Physical Activity: Not on file   Stress: Not on file   Social Connections: Not on file   Intimate Partner Violence: Not on file   Housing Stability: Not on file       FAMILY HISTORY  Family History   Problem Relation Name Age of Onset    Arthritis Mother      Sleep apnea Father      Anxiety disorder Brother      Anxiety disorder Maternal Grandmother      Prostate cancer Maternal Grandfather      COPD Other grandmother        REVIEW OF SYSTEMS  Except for those mentioned in the history of present illness, and below, a complete review of systems is negative.     Review of Systems   Constitutional:  Negative for activity change, fatigue and fever.   Gastrointestinal:  Negative for nausea.   Musculoskeletal:  Positive for arthralgias (left shoulder pain). Negative for gait problem and joint swelling.   Skin:  Negative for color change and rash.   Neurological:  Negative for weakness and numbness.       VITALS  There were no vitals filed for this visit.    PHYSICAL EXAMINATION   GENERAL:  Awake, alert, and oriented, no apparent distress, pleasant, and cooperative  PSYC: Mood is euthymic, affect is congruent  EAR, NOSE, THROAT:  Normocephalic, atraumatic,  moist membranes, anicteric sclera  LUNG: Nonlabored breathing  HEART: No clubbing or cyanosis  SKIN: No increased erythema, warmth, rashes, or concerning skin lesions  NEURO: Sensation is intact in the bilateral upper extremities. Strength is grossly 5 out of 5 throughout the bilateral upper extremities, unless noted below.  GAIT: Non-antalgic.  MUSCULOSKELETAL: Examination of the left shoulder:  Active ROM is full with pain at end range of motion in flexion and abduction  Passive ROM is full.  No scapulohumeral dyskinesis.  No appreciable muscle atrophy.  Tenderness to palpation over the bicipital groove and proximal biceps tendon.  Full can/Empty can is painful.   Speed's is positive.  Yergesons is positive.  Oswald's is negative.  Scarf is negative.  Hawkin's is positive.  Neer's is negative.  Crank test is negative.  ER strength is intact and non-painful.  Bear hug test is negative.    IMAGING STUDIES:   Radiographs of the left shoulder dated 11/3/2023 were personally reviewed and interpreted by me, Dr. Anayeli Maldonado, and the findings shared with the patient.  Subtle cortical irregularity of the superior glenoid..     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  His history and physical exam seem most consistent with left biceps long head tendinopathy.  -Discussion the pros, cons, risks, benefits, wish to proceed with an ultrasound-guided left biceps tendon sheath corticosteroid injection to help with pain inflammation.  Please see procedure note section for complete details.  -Avoid heavy lifting with his left shoulder for the next 3 days can continue to use ice/heat.  -Referral for PT has already been sent by his PCP, encouraged him to go to help  strengthen and stabilize his left shoulder.  -Can continue to use ice/heat, NSAIDs, and Biofreeze as needed.  -Can follow-up as needed.    The patient was counseled to remain active, but avoid activities that worsen symptoms. The patient was in agreement with this plan. All questions were answered to the best of my ability.    PATIENT EDUCATION:  Education was discussed at today's appointment. A learning needs assessment was performed.    Primary learner: Ravi Subramanian  Barriers to learning: None  Preferred language: English  Learning preferences include: Seeing and doing.  Discussed: Diagnosis and treatment plan.  Demonstrated: Understanding of material discussed.  Patient education materials given: None.  Learner response: Learner demonstrated understanding.    This note was dictated using Dragon speech recognition software and was not corrected for spelling or grammatical errors.    Left biceps tendon injection on 11/10/2023 11:15 AM  Details: ultrasound-guided  Medications: 40 mg methylPREDNISolone acetate 40 mg/mL; 1 mL lidocaine PF 10 mg/mL (1 %); 1 mL ropivacaine    Pre-Procedure Diagnosis: left shoulder pain, left proximal biceps tendinopathy  Post-Procedure Diagnosis: left shoulder pain, left proximal biceps tendinopathy    Procedure: US-guided left long head of bicep tendon sheath corticosteroid injection    History of Present Illness: Ravi Subramanian is a pleasant 38 y.o. male with shoulder pain due to long head of biceps tendinopathy who is here for the above procedure for diagnostic purposes and improved pain control.      Medications and allergies were reviewed with the patient. No contraindications were identified.    Informed Consent  Following denial of allergy and review of potential side effects and complications including but not necessarily limited to infection, allergic reaction, local tissue breakdown,  injury to soft tissue and/or nerves and seizure, the patient indicated understanding and  agreed to proceed. Written consent to treatment was obtained and the patient verbalized consent for the procedure.    Procedural Details  The use of direct ultrasound visualization of the needle (rather than a non-guided injection) was required to increase patient safety by excluding inadvertent intramuscular or intratendinous placement and minimizing bleeding by avoiding osteochondral or vascular injury from the needle.  Additionally, the increased accuracy of placement may increase clinical effectiveness and will allow higher diagnostic specificity when evaluating effectiveness of this injection.    Using ultrasound, a pre-scan of the region was performed to identify the target structure. No leona-tendinous effusion was appreciated.    The area was prepped with chlorhexidine, then re-examined using the same transducer, a sterile ultrasound transducer cover, and sterile ultrasound gel.    Procedural pause conducted to verify: Correct patient identity, procedure to be performed, and as applicable, correct side and site, correct patient position, and availability of implants, special equipment, or special requirements.    Procedure:  Transducer: Linear array transducer.  Patient position: Supine with the forearm in a supinated position with the elbow fully extended.   Localization process: With the transducer in an anatomic transverse plane the biceps tendon was localized in a short axis view.    Local anesthesia: Local anesthesia was obtained with 1 cc of 1% lidocaine.   Needle: A 25-gauge, 2-inch needle was used for local anesthesia and the injectate.   Approach: A lateral to medial, in plane, approach was used to guide the needle tip into the bicep tendon sheath.   Injection/Aspiration: A mixture of 1 cc of 0.5% ropivocaine and 1 cc of DepoMedrol (40mg/mL) was injected into the left bicep tendon sheath without complication. Good flow was observed within the bicep tendon sheath.     Post-procedural care: The patient  tolerated the procedure well and reported complete pain relief during the anesthetic phase. The patient was asked to ice for improved pain control and avoid submerging the area in water for the next 48 hours to help reduce the risk of infection. The patient was instructed to call the office immediately if there are any questions or concerns.     PATIENT EDUCATION:  Education of the diagnosis and treatment plan was discussed at today's appointment. A learning needs assessment was performed. The patient demonstrated understanding.        Patient seen and examined with PM&R resident, Dr. Gonsalez. History, physical examination, pertinent imaging findings and the plan of care were discussed and I performed the key portions of the history, physical examination, and discussion of the plan of care. I have edited his note and agree with the findings. Dr. Gonsalez performed the procedure under my direct supervision. I was present for the entire procedure.      Anayeli Maldonado MD    [facility] [place]   [facility]

## 2023-11-19 DIAGNOSIS — L70.9 ACNE, UNSPECIFIED ACNE TYPE: ICD-10-CM

## 2023-11-20 RX ORDER — MINOCYCLINE HYDROCHLORIDE 100 MG/1
100 CAPSULE ORAL 2 TIMES DAILY
Qty: 60 CAPSULE | Refills: 2 | Status: SHIPPED | OUTPATIENT
Start: 2023-11-20 | End: 2024-03-11 | Stop reason: DRUGHIGH

## 2023-12-05 ENCOUNTER — EVALUATION (OUTPATIENT)
Dept: PHYSICAL THERAPY | Facility: CLINIC | Age: 38
End: 2023-12-05
Payer: COMMERCIAL

## 2023-12-05 ENCOUNTER — APPOINTMENT (OUTPATIENT)
Dept: ORTHOPEDIC SURGERY | Facility: CLINIC | Age: 38
End: 2023-12-05
Payer: COMMERCIAL

## 2023-12-05 DIAGNOSIS — M25.512 LEFT SHOULDER PAIN, UNSPECIFIED CHRONICITY: ICD-10-CM

## 2023-12-05 DIAGNOSIS — M75.22 BICEPS TENDONITIS ON LEFT: ICD-10-CM

## 2023-12-05 DIAGNOSIS — M25.512 ACUTE PAIN OF LEFT SHOULDER: Primary | ICD-10-CM

## 2023-12-05 PROCEDURE — 97110 THERAPEUTIC EXERCISES: CPT | Mod: GP

## 2023-12-05 PROCEDURE — 97161 PT EVAL LOW COMPLEX 20 MIN: CPT | Mod: GP

## 2023-12-05 PROCEDURE — 97535 SELF CARE MNGMENT TRAINING: CPT | Mod: GP

## 2023-12-05 NOTE — PROGRESS NOTES
Physical Therapy    Physical Therapy Evaluation and Treatment      Patient Name: Ravi Subramanian  MRN: [id number]  Today's Date: 12/5/2023  Time Calculation  Start Time: 0910    Insurance  Visit number:  1  60 PT/OT/ST vs/yr     Assessment:  Pt. 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 strength/endurance, & abnormal neuromotor control.  Pt. is likely to benefit from skilled interventions to address their impairments, & treatment to emphasize manual techniques to decrease pain & improve joint/soft-tissue mobility, & scapular & GHJ exercises to increase strength, endurance, & neuromotor control.    Standardized testing and measures administered today reveal that the patient has multiple impairments in body structures and functions, activity limitations, and participation restrictions.  The patient has 1 personal factors and comorbidities that may serve as barriers affecting the plan of care, including likely genetic contribution to hypermobility.   Skilled PT services are warranted in order to realize measurable change in the above outcome measures and achieve improvements in the patient's functional status and individual goals.      Plan:  OP PT Plan  Treatment/Interventions: Dry needling, Education/ Instruction, Electrical stimulation, Manual therapy, Neuromuscular re-education, Self care/ home management, Therapeutic activities, Therapeutic exercises  PT Plan: Skilled PT  PT Frequency: One time visit  Duration: in ~4 weeks  Onset Date: 10/05/23  Rehab Potential: Excellent  Plan of Care Agreement: Patient      Current Problem:   1. Acute pain of left shoulder        2. Left shoulder pain, unspecified chronicity  Referral to Physical Therapy      3. Biceps tendonitis on left              Subjective    Ravi Subramanian is a 38 y.o. male who presents with c/o L shoulder pain.  Patient states symptoms began  insidiously a couple months ago.  Pt notices is most with lifting weights, particularly with lateral & forward raises.  He attempted to rest, ice, use ibuprofen but it didn't help. Pt went to see his PCP who referred him to therapy & he also saw Dr. Maldonado who gave him a CSI into his biceps.  Pt reports the injection helped a lot, but he still feels it somewhat.  He's returned to lifting weights but hasn't been pushing it much.  Pt is R hand dominant.      Pain Intensity:  0/10 currently at rest, (10 = worst imaginable pain), 10/10 at worst.    Description:  burning/aching  Duration:   intermittent  Status:  progressively improving      Reports symptoms are aggravated with lifting/carrying, lifting weights, sleeping on L side, reaching out to the side or overhead, and alleviated with CSI, rest, avoiding aggravating activities.     Objective   Observations:  pt was pleasant, cooperative, & A+O x3.  Visual inspection was unremarkable.  Distal PMS was intact & S/S of infection noted.    Palpatory Exam:  pt. c/o their 'familiar' pain along the ant L shoulder near the prox biceps. Hypermobility noted with B GHJ A/P.    Shoulder ROM __ __R [deg]___ ___L [deg]___    Flex______________ ___180_____ ___180_____   ABD/ER__________ ___90_____ ___90_____   Hor ADD_________ ___45_____ ___45_____  ER in 0/90________ ___90_____ ___90_____   Ext/IR/Pron______ ___T7_____ ___T6_____   ER in 90/90______ ___100_____ ___110_____   IR in 90/90_______ ___80_____ ___80_____    * = painful         Shoulder MMT__      ____R_____        ____L______      Flex______________ ___5/5____ ___4+/5___   ABD______________ ___5/5____ ___4+/5___   ER in 0 deg______ ___5/5____ ___5/5___   IR in 0 deg_______ ___5/5____ ___4+/5___   ER in 90 deg_____ ___5/5____ ___5/5___   IR in 90 deg______ ___5/5____ ___4+/5___   Middle Trap______ ___5/5____ ___4+/5___   Lower Trap_______ ___5/5____ ___4/5___   Ext_______________ ___5/5____ ___5/5___   * =  painful          Shoulder Tests_____     ____R____          ____L_____      Painful Arc________ ___[-]_____ ___[-]_____   Full Can__________ ___[-]_____ ___[-]_____   Empty Can________ ___[-]_____ ___[-]_____   Neers____________ ___[-]_____ ___[-]_____   Dong-Kennedy__ ___[-]_____ ___[-]_____    Bear Hug Test_____ ___[-]_____ ___[+]_____   Belly Press Sign____ ___[-]_____ ___[-]_____   IR Lift Off_________ ___[-]_____ ___[-]_____   Speeds___________ ___[-]_____ ___[+]_____   Yergason's________ ___[-]_____ ___[-]_____   Biceps Load II_____ ___[-]_____ ___[+]_____   Load and Shift_____ ___[-]_____ ___[-]_____         Treatment today included:    PT Evaluation (54107):  20 minutes      Therapeutic Exercises (92597):  25 minutes  -  Standing UBE x 6 min (3 min forward, 3 min retro)     Access Code: [email or web address]  URL: [email or web address]  Date: 12/05/2023  Prepared by: Bobby Piedra    Exercises  - Praying Clif Advanced  - 2-4 x weekly - 2-4 sets - 10-15 reps  - Standard Plank  - 2-4 x weekly - 2-4 sets - 10-15 reps  - Plank with Hip Extension  - 2-4 x weekly - 2-4 sets - 10-15 reps  - Side Plank on Elbow  - 2-4 x weekly - 2-4 sets - 10-15 reps  - Side Plank on Full Arm with Rotation  - 2-4 x weekly - 2-4 sets - 10-15 reps  - Down Dog from Plank   - 2-4 x weekly - 2-4 sets - 10-15 reps  - Shoulder External Rotation with Anchored Resistance  - 2-4 x weekly - 2-4 sets - 10-15 reps  - Standing Single Arm Shoulder ER in Abduction  - 2-4 x weekly - 2-4 sets - 10-15 reps  - Shoulder Internal Rotation with Resistance  - 2-4 x weekly - 2-4 sets - 10-15 reps  - Standing Single Arm Shoulder Internal Rotation in Abduction with Anchored Resistance  - 2-4 x weekly - 2-4 sets - 10-15 reps      * = added to HEP.  Sheet with exercise descriptions and images issued.  Skilled intervention utilized in the appropriate selection & application of above exercises.  Verbal and tactile cues provided for proper form and  technique.  Pt. demonstrated appropriate form & verbalized understanding of optimal technique for above exercises.        Self Care / Home Management (99325):  15 minutes  The patient was educated on:  the importance of positioning, proper posture, and body mechanics, joint mechanics and pathology, general tissue healing time, the appropriate use of heat and cold to control pain and inflammation, the importance of general therapeutic exercise, especially to stay within pain-free ROM, specific anatomy, function, & regional interdependence of involved areas, & likely cause of impairments & POC.  Pt's questions were answered to their satisfaction, & pt. verbalized understanding & agreement with POC.      Outcome Measures:  Other Measures  Disability of Arm Shoulder Hand (DASH): 9.09/100       Goals:  Demonstrate independence with home exercise program  Increase B UE ROM to pain free + WNL  Increase B UE strength to pain free + WNL  Report decrease in pain by greater than or equal to 2 points to meet the MCID  Decrease score of Quick DASH by > 8 points to meet the MCID  Perform ADLs without an increase symptoms  Pt. will be able to sleep through the night without an increase in symptoms  Achieve pt. specific goals including: be able to exercise without pain

## 2023-12-12 ENCOUNTER — APPOINTMENT (OUTPATIENT)
Dept: ORTHOPEDIC SURGERY | Facility: CLINIC | Age: 38
End: 2023-12-12
Payer: COMMERCIAL

## 2023-12-12 ENCOUNTER — OFFICE VISIT (OUTPATIENT)
Dept: ORTHOPEDIC SURGERY | Facility: CLINIC | Age: 38
End: 2023-12-12
Payer: COMMERCIAL

## 2023-12-12 DIAGNOSIS — M75.22 BICEPS TENDONITIS ON LEFT: Primary | ICD-10-CM

## 2023-12-12 PROCEDURE — 99213 OFFICE O/P EST LOW 20 MIN: CPT | Performed by: STUDENT IN AN ORGANIZED HEALTH CARE EDUCATION/TRAINING PROGRAM

## 2023-12-12 PROCEDURE — 1036F TOBACCO NON-USER: CPT | Performed by: STUDENT IN AN ORGANIZED HEALTH CARE EDUCATION/TRAINING PROGRAM

## 2023-12-12 ASSESSMENT — PAIN - FUNCTIONAL ASSESSMENT: PAIN_FUNCTIONAL_ASSESSMENT: NO/DENIES PAIN

## 2023-12-17 NOTE — PROGRESS NOTES
PRIMARY CARE PHYSICIAN: Liban Tapia DO    ORTHOPAEDIC FOLLOW-UP: Shoulder Evaluation        ASSESSMENT & PLAN    Impression: 38 y.o. male with left biceps tendinitis.  He is status post a corticosteroid injection provided under ultrasound on 11/10/2023.  This is completely resolved this pain.  He returned to normal activities as tolerated.  He will follow-up as needed moving forward.    Plan:   I reviewed with the patient the nature of their diagnosis.  I reviewed their imaging studies with them.    Based on the history, physical exam and imaging studies above, the patient's presentation is consistent with the above diagnosis.  I had a long discussion with the patient regarding their presentation and the treatment options.  We discussed initial nonoperative versus operative management options as well as potential further diagnostic imaging.     At this time, his pain is completely resolved.  He can return to sport and life as tolerated.    Follow-Up: Patient will follow-up as needed.    At the end of the visit, all questions were answered in full. The patient is in agreement with the plan and recommendations. They will call the office with any questions/concerns.      Note dictated with [employer] software. Completed without full typed error editing and sent to avoid delay.       SUBJECTIVE  CHIEF COMPLAINT:   Chief Complaint   Patient presents with    Left Shoulder - Follow-up     FUV - Left shoulder, states CSI did help and just started to go to PT.         HPI: Ravi Subramanain is a 38 y.o. patient. Ravi Subramanian presents for follow-up of left proximal biceps tendinitis.  He is status post a left ultrasound-guided corticosteroid injection performed 11/10/2023.  This is completely resolved his pain.  He has returned to daily activities without restrictions.  No reports of anterior shoulder pain.  No reports of decreased motion or weakness.  No distal numbness or tingling.    REVIEW OF  SYSTEMS  Constitutional: See HPI for pain assessment, No significant weight loss, recent trauma  Cardiovascular: No chest pain, shortness of breath  Respiratory: No difficulty breathing, cough  Gastrointestinal: No nausea, vomiting, diarrhea, constipation  Musculoskeletal: Noted in HPI, positive for pain, restricted motion, stiffness  Integumentary: No rashes, easy bruising, redness   Neurological: no numbness or tingling in extremities, no gait disturbances   Psychiatric: No mood changes, memory changes, social issues  Heme/Lymph: no excessive swelling, easy bruising, excessive bleeding  ENT: No hearing changes  Eyes: No vision changes    Past Medical History:   Diagnosis Date    Other specified health status     No pertinent past medical history        No Known Allergies     Past Surgical History:   Procedure Laterality Date    HERNIA REPAIR  07/22/2021    Hernia Repair    WISDOM TOOTH EXTRACTION          Family History   Problem Relation Name Age of Onset    Arthritis Mother      Sleep apnea Father      Anxiety disorder Brother      Anxiety disorder Maternal Grandmother      Prostate cancer Maternal Grandfather      COPD Other grandmother         Social History     Socioeconomic History    Marital status: Significant Other     Spouse name: Not on file    Number of children: 0    Years of education: Not on file    Highest education level: Not on file   Occupational History    Occupation:    Tobacco Use    Smoking status: Never     Passive exposure: Never    Smokeless tobacco: Never   Vaping Use    Vaping Use: Never used   Substance and Sexual Activity    Alcohol use: Yes     Alcohol/week: 1.0 standard drink of alcohol     Types: 1 Standard drinks or equivalent per week    Drug use: Never    Sexual activity: Not on file   Other Topics Concern    Not on file   Social History Narrative    Not on file     Social Determinants of Health     Financial Resource Strain: Not on file   Food Insecurity: Not on  "file   Transportation Needs: Not on file   Physical Activity: Not on file   Stress: Not on file   Social Connections: Not on file   Intimate Partner Violence: Not on file   Housing Stability: Not on file        CURRENT MEDICATIONS:   Current Outpatient Medications   Medication Sig Dispense Refill    amphetamine-dextroamphetamine (Adderall) 30 mg tablet Take 1 tablet (30 mg) by mouth 2 times a day. 60 tablet 0    atorvastatin (Lipitor) 40 mg tablet Take 1 tablet (40 mg) by mouth once daily. 90 tablet 1    finasteride (Propecia) 1 mg tablet TAKE 1 TABLET BY MOUTH EVERY DAY (Patient not taking: Reported on 11/3/2023) 90 tablet 3    minocycline 100 mg capsule TAKE 1 CAPSULE BY MOUTH TWICE A DAY 60 capsule 2    zolpidem CR (Ambien CR) 6.25 mg ER tablet Take 1 tablet (6.25 mg) by mouth as needed at bedtime for sleep. Do not crush, chew, or split. 30 tablet 0     No current facility-administered medications for this visit.        OBJECTIVE    PHYSICAL EXAM      7/22/2021    10:12 AM 7/15/2022     1:35 PM 3/10/2023     9:47 AM 7/25/2023     8:28 AM 10/17/2023     2:48 PM 11/3/2023     2:22 PM   Vitals   Systolic 128 122 118 124 124    Diastolic 90 78 84 84 82    Heart Rate 77 78 80 77 74    Temp 36.7 °C (98 °F) 36.2 °C (97.2 °F) 36.3 °C (97.4 °F) 36.6 °C (97.9 °F) 36.5 °C (97.7 °F)    Resp 16 16 18 18 18    Height (in) 1.854 m (6' 1\") 1.854 m (6' 1\") 1.854 m (6' 1\") 1.854 m (6' 1\") 1.854 m (6' 1\") 1.854 m (6' 1\")   Weight (lb) 246 239.4 234 213 226 220   BMI 32.46 kg/m2 31.59 kg/m2 30.87 kg/m2 28.1 kg/m2 29.82 kg/m2 29.03 kg/m2   BSA (m2) 2.4 m2 2.37 m2 2.34 m2 2.23 m2 2.3 m2 2.27 m2   Visit Report   Report Report Report Report      There is no height or weight on file to calculate BMI.    GENERAL: A/Ox3, NAD. Appears healthy, well nourished  PSYCHIATRIC: Mood stable, appropriate memory recall  EYES: EOM intact, no scleral icterus  CARDIOVASCULAR: Palpable peripheral pulses  LUNGS: Breathing non-labored on room air  SKIN: " no erythema, rashes, or ecchymosis     MUSCULOSKELETAL:  Laterality: left Shoulder Exam  - Negative Spurlings, full painless neck ROM  - Skin intact  - No erythema or warmth  - No ecchymosis or soft tissue swelling  - Shoulder ROM: Forward flexion 160, abduction 150, external rotation 45, internal rotation lumbar spine.  - Strength:      Jobes 5/5     ER 5/5     Belly press and bearhug 5/5  - Palpation: No tenderness to palpation along the proximal aspect of the biceps tendon.  - Dong and Neers negative  - Speeds and O'Briens negative  - Stability: Anterior/Posterior load and shift stable.    NEUROVASCULAR:  - Neurovascular Status: sensation intact to light touch distally, upper extremity motor grossly intact  - Capillary refill brisk at extremities, peripheral pulses 2+    No new x-rays obtained today.      Jos Kelley MD, MPH  Attending Surgeon    Sports Medicine Orthopaedic Surgery  [facility] [place]  [facility] [employer]

## 2024-01-17 DIAGNOSIS — E78.5 HYPERLIPIDEMIA, UNSPECIFIED HYPERLIPIDEMIA TYPE: ICD-10-CM

## 2024-01-18 RX ORDER — ATORVASTATIN CALCIUM 40 MG/1
40 TABLET, FILM COATED ORAL DAILY
Qty: 90 TABLET | Refills: 3 | Status: SHIPPED | OUTPATIENT
Start: 2024-01-18

## 2024-02-22 ENCOUNTER — HOSPITAL ENCOUNTER (OUTPATIENT)
Dept: RADIOLOGY | Facility: EXTERNAL LOCATION | Age: 39
Discharge: HOME | End: 2024-02-22

## 2024-02-22 DIAGNOSIS — S39.92XA LOWER BACK INJURY, INITIAL ENCOUNTER: ICD-10-CM

## 2024-02-26 ENCOUNTER — OFFICE VISIT (OUTPATIENT)
Dept: ORTHOPEDIC SURGERY | Facility: CLINIC | Age: 39
End: 2024-02-26
Payer: COMMERCIAL

## 2024-02-26 VITALS — HEIGHT: 73 IN | WEIGHT: 215 LBS | BODY MASS INDEX: 28.49 KG/M2

## 2024-02-26 DIAGNOSIS — S39.012A ACUTE MYOFASCIAL STRAIN OF LUMBAR REGION, INITIAL ENCOUNTER: Primary | ICD-10-CM

## 2024-02-26 PROCEDURE — 1036F TOBACCO NON-USER: CPT | Performed by: ORTHOPAEDIC SURGERY

## 2024-02-26 PROCEDURE — 99214 OFFICE O/P EST MOD 30 MIN: CPT | Performed by: ORTHOPAEDIC SURGERY

## 2024-02-26 RX ORDER — METHOCARBAMOL 500 MG/1
500 TABLET, FILM COATED ORAL 3 TIMES DAILY PRN
Qty: 90 TABLET | Refills: 0 | Status: SHIPPED | OUTPATIENT
Start: 2024-02-26 | End: 2024-03-11

## 2024-02-26 ASSESSMENT — PAIN SCALES - GENERAL: PAINLEVEL: 6

## 2024-02-27 NOTE — PROGRESS NOTES
S: Ravi Subramanian is a 38 y.o. year old male patient who was previously seen in my clinic back in August 2022 for cervical radiculopathy.  She completed a course of physical therapy and ultimately did well.  He is here today for new problem.  He is here today for low back pain.  He states that on Wednesday about 5 days prior to presentation he was doing dead lifts about more than 200 pounds when he felt a sudden pop in his lower back and felt acute back pain causing him to develop tunnel vision.  He had to lie down.  He had immediate pain  And radiate down his legs.  Once the pain kind of subsided the pain was mainly in his lower back around the lumbosacral region.  Woke up the next day with severe low back pain.  Was seen at the urgent care on February 22, 2024 where he was discharged on tapered steroid and Tylenol No. 2.  He did get some temporary relief and currently his pain is a little bit better about 6 out of 10 compared to 10 out of 10.  He is not having any radicular leg pain numbness tingling or paresthesias denies any bowel or bladder disturbances or saddle anesthesia.  Pain seems to be mainly across the lower back usually worse with activity such as changing positions going from supine to sitting or sitting to standing.      O:     General: Patient appears well-nourished and well-developed in no acute distress, Alert and Oriented x3  Psych: Pleasant mood and affect  HEENT: Extraocular muscles intact, pupils equal and round. Sclerae anicteric   Cardio: extremities warm and well perfused  Resp: unlabored symmetric breathing  Skin: Intact  Musculoskeletal/Neuro Exam: Normal gait.  Mild tenderness to palpation along the lumbar midline and paraspinal regions.  Negative straight leg raise.  Tight hamstrings bilaterally.  No groin pain with ROM of the hip joints bilaterally      Lower extremity    Motor: Right leg with 5 out of 5 motor strength with hip flexion, knee extension, ankle dorsiflexion plantarflexion  EHL against resistance  Left leg with 5 out of 5 motor strength with hip flexion, knee extension, ankle dorsiflexion plantarflexion EHL against resistance    Sensation to light touch intact along L2-S1 bilateraly    2+ Reflex patella and achilles           Imaging:    I reviewed x-rays of the lumbar spine from February 22, 2024 from the urgent care [facility] lateral views.  No acute fractures or dislocations.  No spondylolisthesis.          A/P: Ravi Subramanian is a 38 y.o. year old male patient with acute low back pain after doing dead lifts.  No radicular leg pain likely lumbar strain.  Recommend conservative treatments with physical therapy which she was given a referral today.  I also gave him prescription for Robaxin as needed for muscle spasm.  He can complete his course of steroids and then after that I recommend taking an anti-inflammatory such as ibuprofen Motrin or Aleve as needed for pain.  If he is not getting any better with therapy or if his symptoms worsens or starts to develop any radicular leg pain then I would like to see him back in clinic.  In the meantime I would avoid any heavy lifting or dead lift or any strenuous activities until his pain improves. After our discussion, the patient articulated understanding of the plan and felt that all questions had been answered satisfactorily. The patient was pleased with the visit and very appreciative for the care rendered.    **Please excuse any errors in grammar or translation related to this dictation. Voice recognition software was utilized to prepare this document. **                 Mariela Angulo MD    Department of Orthopaedic Surgery  [facility]  [email or web address]

## 2024-03-11 ENCOUNTER — OFFICE VISIT (OUTPATIENT)
Dept: PRIMARY CARE | Facility: CLINIC | Age: 39
End: 2024-03-11
Payer: COMMERCIAL

## 2024-03-11 VITALS
HEART RATE: 57 BPM | BODY MASS INDEX: 29.42 KG/M2 | TEMPERATURE: 97.7 F | HEIGHT: 73 IN | OXYGEN SATURATION: 98 % | SYSTOLIC BLOOD PRESSURE: 126 MMHG | DIASTOLIC BLOOD PRESSURE: 84 MMHG | WEIGHT: 222 LBS | RESPIRATION RATE: 18 BRPM

## 2024-03-11 DIAGNOSIS — E55.9 HYPOVITAMINOSIS D: ICD-10-CM

## 2024-03-11 DIAGNOSIS — L65.9 THINNING HAIR: ICD-10-CM

## 2024-03-11 DIAGNOSIS — R68.82 LOW LIBIDO: ICD-10-CM

## 2024-03-11 DIAGNOSIS — G47.00 INSOMNIA, UNSPECIFIED TYPE: ICD-10-CM

## 2024-03-11 DIAGNOSIS — L70.9 ACNE, UNSPECIFIED ACNE TYPE: ICD-10-CM

## 2024-03-11 DIAGNOSIS — Z00.00 HEALTHCARE MAINTENANCE: Primary | ICD-10-CM

## 2024-03-11 DIAGNOSIS — E78.5 HYPERLIPIDEMIA, UNSPECIFIED HYPERLIPIDEMIA TYPE: ICD-10-CM

## 2024-03-11 DIAGNOSIS — R53.83 FATIGUE, UNSPECIFIED TYPE: ICD-10-CM

## 2024-03-11 LAB
POC APPEARANCE, URINE: CLEAR
POC BILIRUBIN, URINE: NEGATIVE
POC BLOOD, URINE: NEGATIVE
POC COLOR, URINE: YELLOW
POC GLUCOSE, URINE: NEGATIVE MG/DL
POC KETONES, URINE: NEGATIVE MG/DL
POC LEUKOCYTES, URINE: NEGATIVE
POC NITRITE,URINE: NEGATIVE
POC PH, URINE: 6.5 PH
POC PROTEIN, URINE: NEGATIVE MG/DL
POC SPECIFIC GRAVITY, URINE: 1.01
POC UROBILINOGEN, URINE: 0.2 EU/DL

## 2024-03-11 PROCEDURE — 99395 PREV VISIT EST AGE 18-39: CPT | Performed by: FAMILY MEDICINE

## 2024-03-11 PROCEDURE — 1036F TOBACCO NON-USER: CPT | Performed by: FAMILY MEDICINE

## 2024-03-11 PROCEDURE — 81002 URINALYSIS NONAUTO W/O SCOPE: CPT | Performed by: FAMILY MEDICINE

## 2024-03-11 RX ORDER — ZOLPIDEM TARTRATE 6.25 MG/1
6.25 TABLET, FILM COATED, EXTENDED RELEASE ORAL NIGHTLY PRN
Qty: 30 TABLET | Refills: 0 | Status: SHIPPED | OUTPATIENT
Start: 2024-03-11 | End: 2024-04-10

## 2024-03-11 RX ORDER — MINOCYCLINE HYDROCHLORIDE 100 MG/1
100 CAPSULE ORAL 2 TIMES DAILY PRN
Qty: 60 CAPSULE | Refills: 2 | Status: SHIPPED | COMMUNITY
Start: 2024-03-11

## 2024-03-11 ASSESSMENT — PATIENT HEALTH QUESTIONNAIRE - PHQ9
1. LITTLE INTEREST OR PLEASURE IN DOING THINGS: NOT AT ALL
SUM OF ALL RESPONSES TO PHQ9 QUESTIONS 1 AND 2: 0
2. FEELING DOWN, DEPRESSED OR HOPELESS: NOT AT ALL

## 2024-03-11 ASSESSMENT — ENCOUNTER SYMPTOMS
HEADACHES: 0
LOSS OF SENSATION IN FEET: 0
OCCASIONAL FEELINGS OF UNSTEADINESS: 0
DEPRESSION: 0
SHORTNESS OF BREATH: 0

## 2024-03-11 NOTE — PROGRESS NOTES
"Subjective     Ravi Subramanian is a 38 y.o. male who presents for Annual Exam.    HPI     Pt is here today for annual well exam.     Pt has HLD, on atorvastatin 20 mg daily.  He had recent labs done.      Hx of ADHD, diagnosed by psychiatry (telehealth) - on adderall 20 mg and adderall 5 mg BID.  He sees psychiatrist, Dr. Eden.      Pt injured his lower back a few weeks ago, went to  urgent , referred to ortho, Dr. Angulo, who recommends physical therapy, prescribed muscle relaxer as well.  His back pain has improved.  He has not yet scheduled with PT yet but plans to do so.      Review of Systems   Respiratory:  Negative for shortness of breath.    Cardiovascular:  Negative for chest pain.   Neurological:  Negative for headaches.       Objective     Vitals:    03/11/24 0913 03/11/24 0934   BP: 134/90 126/84   BP Location: Left arm    Patient Position: Sitting    Pulse: 57    Resp: 18    Temp: 36.5 °C (97.7 °F)    TempSrc: Temporal    SpO2: 98%    Weight: 101 kg (222 lb)    Height: 1.854 m (6' 1\")         Current Outpatient Medications   Medication Instructions    amphetamine-dextroamphetamine (Adderall) 30 mg tablet 1 tablet, oral, 2 times daily    atorvastatin (LIPITOR) 40 mg, oral, Daily    finasteride (PROPECIA) 1 mg, oral, Daily    minocycline 100 mg, oral, 2 times daily PRN    zolpidem CR (AMBIEN CR) 6.25 mg, oral, Nightly PRN, Do not crush, chew, or split.        Past Surgical History:   Procedure Laterality Date    HERNIA REPAIR  07/22/2021    Hernia Repair    WISDOM TOOTH EXTRACTION          Social History     Tobacco Use    Smoking status: Never     Passive exposure: Never    Smokeless tobacco: Never   Vaping Use    Vaping Use: Never used   Substance Use Topics    Alcohol use: Yes     Alcohol/week: 1.0 standard drink of alcohol     Types: 1 Standard drinks or equivalent per week    Drug use: Never        Family History   Problem Relation Name Age of Onset    Arthritis Mother      Sleep apnea Father      " Anxiety disorder Brother      Anxiety disorder Maternal Grandmother      Prostate cancer Maternal Grandfather      COPD Other grandmother         Immunization History   Administered Date(s) Administered    Flu vaccine (IIV4), preservative free *Check age/dose* 11/13/2020, 10/19/2022    Flu vaccine, quadrivalent, no egg protein, age 6 month or greater (FLUCELVAX) 11/08/2021    Influenza, Unspecified 09/26/2023    Influenza, seasonal, injectable 11/08/2021    Moderna SARS-CoV-2 Vaccination 11/11/2021    Pfizer COVID-19 vaccine, Fall 2023, 12 years and older, (30mcg/0.3mL) 10/16/2023    Pfizer COVID-19 vaccine, bivalent, age 12 years and older (30 mcg/0.3 mL) 09/07/2022    Pfizer Purple Cap SARS-CoV-2 03/18/2021, 04/09/2021    Tdap vaccine, age 7 year and older (BOOSTRIX, ADACEL) 07/22/2021        Physical Exam  Vitals reviewed.   Constitutional:       General: He is not in acute distress.     Appearance: Normal appearance.   HENT:      Head: Normocephalic and atraumatic.      Right Ear: Tympanic membrane, ear canal and external ear normal.      Left Ear: Tympanic membrane, ear canal and external ear normal.      Nose: Nose normal.      Mouth/Throat:      Mouth: Mucous membranes are moist.      Pharynx: Oropharynx is clear. No oropharyngeal exudate or posterior oropharyngeal erythema.   Eyes:      Extraocular Movements: Extraocular movements intact.      Pupils: Pupils are equal, round, and reactive to light.   Neck:      Thyroid: No thyroid mass or thyromegaly.   Cardiovascular:      Rate and Rhythm: Normal rate and regular rhythm.      Heart sounds: No murmur heard.  Pulmonary:      Effort: Pulmonary effort is normal. No respiratory distress.      Breath sounds: Normal breath sounds. No wheezing, rhonchi or rales.   Abdominal:      General: Abdomen is flat. There is no distension.      Palpations: Abdomen is soft.      Tenderness: There is no abdominal tenderness.   Genitourinary:     Testes: Normal.    Musculoskeletal:         General: Normal range of motion.   Lymphadenopathy:      Cervical: No cervical adenopathy.   Skin:     General: Skin is warm and dry.      Findings: No rash.   Neurological:      General: No focal deficit present.      Mental Status: He is alert and oriented to person, place, and time. Mental status is at baseline.   Psychiatric:         Mood and Affect: Mood normal.         Behavior: Behavior normal.         Problem List Items Addressed This Visit       Hyperlipidemia    Relevant Orders    Comprehensive Metabolic Panel    Lipid Panel    Hypovitaminosis D    Relevant Orders    Vitamin D 25-Hydroxy,Total (for eval of Vitamin D levels)    Insomnia    Relevant Medications    zolpidem CR (Ambien CR) 6.25 mg ER tablet    Thinning hair    Relevant Orders    Prostate Specific Antigen     Other Visit Diagnoses       Healthcare maintenance    -  Primary    Relevant Orders    Comprehensive Metabolic Panel    Lipid Panel    CBC and Auto Differential    Hemoglobin A1C    POCT UA (nonautomated) manually resulted (Completed)    Low libido        Relevant Orders    TSH with reflex to Free T4 if abnormal    Testosterone    Fatigue, unspecified type        Relevant Orders    TSH with reflex to Free T4 if abnormal    Testosterone    Acne, unspecified acne type        Relevant Medications    minocycline 100 mg capsule            Assessment/Plan     Well Exam     Vaccines - tdap utd     I recommend yearly flu vaccine and routine COVID vaccinations as indicated     Complete labs    Healthy diet, routine exercise was discussed with patient      ADHD - on adderall 30 mg BID, sees pyschiatry    HLD - on statin    Insomnia - pt uses ambien as needed, refilled.  I have personally reviewed the [facility] report today for this patient and it is consistent with the prescribed therapy.  We weighed the risks and benefits of this therapy.  I have considered the risk of abuse, dependence, addiction, and diversion.  I will  continue with the current treatment plan    Low libido, fatigue- check TSH, testosterone.    Follow up in 1 month if fatigue/libido changes persist, otherwise follow up yearly for well exam

## 2024-03-12 ENCOUNTER — LAB (OUTPATIENT)
Dept: LAB | Facility: LAB | Age: 39
End: 2024-03-12
Payer: COMMERCIAL

## 2024-03-12 DIAGNOSIS — E78.5 HYPERLIPIDEMIA, UNSPECIFIED HYPERLIPIDEMIA TYPE: ICD-10-CM

## 2024-03-12 DIAGNOSIS — L65.9 THINNING HAIR: ICD-10-CM

## 2024-03-12 DIAGNOSIS — E55.9 HYPOVITAMINOSIS D: ICD-10-CM

## 2024-03-12 DIAGNOSIS — R68.82 LOW LIBIDO: ICD-10-CM

## 2024-03-12 DIAGNOSIS — R53.83 FATIGUE, UNSPECIFIED TYPE: ICD-10-CM

## 2024-03-12 DIAGNOSIS — Z00.00 HEALTHCARE MAINTENANCE: ICD-10-CM

## 2024-03-12 LAB
25(OH)D3 SERPL-MCNC: 32 NG/ML (ref 30–100)
ALBUMIN SERPL BCP-MCNC: 4.5 G/DL (ref 3.4–5)
ALP SERPL-CCNC: 60 U/L (ref 33–120)
ALT SERPL W P-5'-P-CCNC: 17 U/L (ref 10–52)
ANION GAP SERPL CALC-SCNC: 13 MMOL/L (ref 10–20)
AST SERPL W P-5'-P-CCNC: 26 U/L (ref 9–39)
BASOPHILS # BLD AUTO: 0.04 X10*3/UL (ref 0–0.1)
BASOPHILS NFR BLD AUTO: 0.5 %
BILIRUB SERPL-MCNC: 0.8 MG/DL (ref 0–1.2)
BUN SERPL-MCNC: 17 MG/DL (ref 6–23)
CALCIUM SERPL-MCNC: 9.8 MG/DL (ref 8.6–10.3)
CHLORIDE SERPL-SCNC: 105 MMOL/L (ref 98–107)
CHOLEST SERPL-MCNC: 176 MG/DL (ref 0–199)
CHOLESTEROL/HDL RATIO: 3
CO2 SERPL-SCNC: 28 MMOL/L (ref 21–32)
CREAT SERPL-MCNC: 1.19 MG/DL (ref 0.5–1.3)
EGFRCR SERPLBLD CKD-EPI 2021: 81 ML/MIN/1.73M*2
EOSINOPHIL # BLD AUTO: 0.24 X10*3/UL (ref 0–0.7)
EOSINOPHIL NFR BLD AUTO: 3 %
ERYTHROCYTE [DISTWIDTH] IN BLOOD BY AUTOMATED COUNT: 12.4 % (ref 11.5–14.5)
EST. AVERAGE GLUCOSE BLD GHB EST-MCNC: 103 MG/DL
GLUCOSE SERPL-MCNC: 85 MG/DL (ref 74–99)
HBA1C MFR BLD: 5.2 %
HCT VFR BLD AUTO: 43.8 % (ref 41–52)
HDLC SERPL-MCNC: 57.8 MG/DL
HGB BLD-MCNC: 14.1 G/DL (ref 13.5–17.5)
IMM GRANULOCYTES # BLD AUTO: 0.02 X10*3/UL (ref 0–0.7)
IMM GRANULOCYTES NFR BLD AUTO: 0.3 % (ref 0–0.9)
LDLC SERPL CALC-MCNC: 107 MG/DL
LYMPHOCYTES # BLD AUTO: 2.26 X10*3/UL (ref 1.2–4.8)
LYMPHOCYTES NFR BLD AUTO: 28.6 %
MCH RBC QN AUTO: 30.7 PG (ref 26–34)
MCHC RBC AUTO-ENTMCNC: 32.2 G/DL (ref 32–36)
MCV RBC AUTO: 95 FL (ref 80–100)
MONOCYTES # BLD AUTO: 0.63 X10*3/UL (ref 0.1–1)
MONOCYTES NFR BLD AUTO: 8 %
NEUTROPHILS # BLD AUTO: 4.71 X10*3/UL (ref 1.2–7.7)
NEUTROPHILS NFR BLD AUTO: 59.6 %
NON HDL CHOLESTEROL: 118 MG/DL (ref 0–149)
NRBC BLD-RTO: 0 /100 WBCS (ref 0–0)
PLATELET # BLD AUTO: 253 X10*3/UL (ref 150–450)
POTASSIUM SERPL-SCNC: 4.7 MMOL/L (ref 3.5–5.3)
PROT SERPL-MCNC: 6.6 G/DL (ref 6.4–8.2)
PSA SERPL-MCNC: 0.25 NG/ML
RBC # BLD AUTO: 4.59 X10*6/UL (ref 4.5–5.9)
SODIUM SERPL-SCNC: 141 MMOL/L (ref 136–145)
TESTOST SERPL-MCNC: 695 NG/DL (ref 240–1000)
TRIGL SERPL-MCNC: 58 MG/DL (ref 0–149)
TSH SERPL-ACNC: 2.4 MIU/L (ref 0.44–3.98)
VLDL: 12 MG/DL (ref 0–40)
WBC # BLD AUTO: 7.9 X10*3/UL (ref 4.4–11.3)

## 2024-03-12 PROCEDURE — 84443 ASSAY THYROID STIM HORMONE: CPT

## 2024-03-12 PROCEDURE — 85025 COMPLETE CBC W/AUTO DIFF WBC: CPT

## 2024-03-12 PROCEDURE — 84153 ASSAY OF PSA TOTAL: CPT

## 2024-03-12 PROCEDURE — 80053 COMPREHEN METABOLIC PANEL: CPT

## 2024-03-12 PROCEDURE — 80061 LIPID PANEL: CPT

## 2024-03-12 PROCEDURE — 84403 ASSAY OF TOTAL TESTOSTERONE: CPT

## 2024-03-12 PROCEDURE — 36415 COLL VENOUS BLD VENIPUNCTURE: CPT

## 2024-03-12 PROCEDURE — 82306 VITAMIN D 25 HYDROXY: CPT

## 2024-03-12 PROCEDURE — 83036 HEMOGLOBIN GLYCOSYLATED A1C: CPT

## 2024-08-17 DIAGNOSIS — L65.9 NONSCARRING HAIR LOSS, UNSPECIFIED: ICD-10-CM

## 2024-08-19 RX ORDER — FINASTERIDE 1 MG/1
1 TABLET, FILM COATED ORAL DAILY
Qty: 90 TABLET | Refills: 1 | Status: SHIPPED | OUTPATIENT
Start: 2024-08-19

## 2025-01-24 ENCOUNTER — OFFICE VISIT (OUTPATIENT)
Dept: URGENT CARE | Age: 40
End: 2025-01-24
Payer: COMMERCIAL

## 2025-01-24 ENCOUNTER — ANCILLARY PROCEDURE (OUTPATIENT)
Dept: URGENT CARE | Age: 40
End: 2025-01-24
Payer: COMMERCIAL

## 2025-01-24 VITALS
OXYGEN SATURATION: 99 % | TEMPERATURE: 98.2 F | WEIGHT: 177 LBS | HEIGHT: 73 IN | HEART RATE: 68 BPM | BODY MASS INDEX: 23.46 KG/M2 | DIASTOLIC BLOOD PRESSURE: 89 MMHG | SYSTOLIC BLOOD PRESSURE: 124 MMHG

## 2025-01-24 DIAGNOSIS — S89.91XA INJURY OF RIGHT KNEE, INITIAL ENCOUNTER: ICD-10-CM

## 2025-01-24 DIAGNOSIS — S89.91XA INJURY OF RIGHT KNEE, INITIAL ENCOUNTER: Primary | ICD-10-CM

## 2025-01-24 PROCEDURE — 73562 X-RAY EXAM OF KNEE 3: CPT | Mod: RIGHT SIDE | Performed by: FAMILY MEDICINE

## 2025-01-24 RX ORDER — MELOXICAM 15 MG/1
15 TABLET ORAL DAILY
Qty: 30 TABLET | Refills: 0 | Status: SHIPPED | OUTPATIENT
Start: 2025-01-24 | End: 2025-02-23

## 2025-01-24 NOTE — PROGRESS NOTES
"Subjective   Patient ID: Ravi Subramanian is a 39 y.o. male. He presents today with a chief complaint of Injury (Right knee discomfort). While playing soccer last evening, patient planted his right knee, fell; felt sharp pain in his right knee that resolved after icing. No feels discomfort in his right knee and hears cracking and popping in his right knee after full extension.    History of Present Illness  HPI    Past Medical History  Allergies as of 01/24/2025    (No Known Allergies)       (Not in a hospital admission)       Past Medical History:   Diagnosis Date    Other specified health status     No pertinent past medical history       Past Surgical History:   Procedure Laterality Date    HERNIA REPAIR  07/22/2021    Hernia Repair    WISDOM TOOTH EXTRACTION          reports that he has never smoked. He has never been exposed to tobacco smoke. He has never used smokeless tobacco. He reports current alcohol use of about 1.0 standard drink of alcohol per week. He reports that he does not use drugs.    Review of Systems  Review of Systems                               Objective    Vitals:    01/24/25 1125   BP: 124/89   Pulse: 68   Temp: 36.8 °C (98.2 °F)   TempSrc: Oral   SpO2: 99%   Weight: 80.3 kg (177 lb)   Height: 1.854 m (6' 1\")     No LMP for male patient.    Physical Exam  Vitals and nursing note reviewed.   Constitutional:       General: He is not in acute distress.     Appearance: Normal appearance. He is not toxic-appearing.   Cardiovascular:      Rate and Rhythm: Normal rate.      Pulses: Normal pulses.   Musculoskeletal:      Right knee: Swelling present. No deformity, bony tenderness or crepitus. ACL laxity present.      Left knee: Normal.      Comments: +tenderness to palpation, medial meniscus and lateral meniscus; no crepitus   Skin:     General: Skin is warm and dry.      Capillary Refill: Capillary refill takes less than 2 seconds.   Neurological:      Mental Status: He is alert.      Sensory: No " sensory deficit.         Procedures    Point of Care Test & Imaging Results from this visit  No results found for this visit on 01/24/25.   No results found.    Diagnostic study results (if any) were reviewed by Eleanor Mitchell MD.    Assessment/Plan   Allergies, medications, history, and pertinent labs/EKGs/Imaging reviewed by Eleanor Mitchell MD.     Medical Decision Making      Orders and Diagnoses  There are no diagnoses linked to this encounter.    Medical Admin Record      Patient disposition: Home    Electronically signed by Eleanor Mitchell MD  11:27 AM

## 2025-02-19 DIAGNOSIS — L65.9 NONSCARRING HAIR LOSS, UNSPECIFIED: ICD-10-CM

## 2025-02-19 RX ORDER — FINASTERIDE 1 MG/1
1 TABLET, FILM COATED ORAL DAILY
Qty: 30 TABLET | Refills: 0 | Status: SHIPPED | OUTPATIENT
Start: 2025-02-19

## 2025-03-17 ENCOUNTER — OFFICE VISIT (OUTPATIENT)
Dept: ORTHOPEDIC SURGERY | Facility: CLINIC | Age: 40
End: 2025-03-17
Payer: COMMERCIAL

## 2025-03-17 VITALS — BODY MASS INDEX: 23.19 KG/M2 | HEIGHT: 73 IN | WEIGHT: 175 LBS

## 2025-03-17 DIAGNOSIS — S89.91XA INJURY OF RIGHT KNEE, INITIAL ENCOUNTER: ICD-10-CM

## 2025-03-17 DIAGNOSIS — M25.461 EFFUSION OF RIGHT KNEE: Primary | ICD-10-CM

## 2025-03-17 PROCEDURE — 99213 OFFICE O/P EST LOW 20 MIN: CPT | Performed by: PHYSICIAN ASSISTANT

## 2025-03-17 PROCEDURE — 3008F BODY MASS INDEX DOCD: CPT | Performed by: PHYSICIAN ASSISTANT

## 2025-03-17 PROCEDURE — 1036F TOBACCO NON-USER: CPT | Performed by: PHYSICIAN ASSISTANT

## 2025-03-17 ASSESSMENT — PAIN SCALES - GENERAL: PAINLEVEL_OUTOF10: 3

## 2025-03-17 ASSESSMENT — PAIN - FUNCTIONAL ASSESSMENT: PAIN_FUNCTIONAL_ASSESSMENT: 0-10

## 2025-03-17 NOTE — PROGRESS NOTES
"Subjective    Patient ID: Ravi Subramanian is a 39 y.o. male.    Chief Complaint: Injury of the Right Knee           HPI:  Ravi Subramanian is a 39 y.o. male who presents today for evaluation of his right knee.  He states that on 1/23/2025 he was playing soccer and put his foot into the ground to make it cut.  He felt his knee buckle.  He discontinued play and was seen at the urgent care the following day.  He had x-rays done there and was given a prescription for meloxicam.  He states that immediately after the injury he developed a significant mount of swelling in his knee.  Today he reports that swelling is slightly improved.  He continues to have pain and a \"pop\" when he goes into deep knee flexion.  He notes presence of some mild pain when going up stairs.  When his knee is fully extended he feels some level of instability.  He has had difficulty returning back to his full athletic endeavors.  He does have a history of a tear to the lateral meniscus in his right knee which was treated with physical therapy.  His left knee is currently asymptomatic.    ROS  Constitutional: No fever, no chills, not feeling tired, no recent weight gain and no recent weight loss  ENT: No nosebleeds  Cardiovascular: No chest pain  Respiratory: No shortness of breath and no cough  Gastrointestinal: No abdominal pain, no nausea, no diarrhea, and no vomiting  Musculoskeletal: No arthralgias  Integumentary: No rashes and no skin lesions  Neurological: No headache  Psychiatric: No sleep disturbances no depression  Endocrine: No muscle weakness and no muscle cramps  Hematologic/lymphatic: No swelling glands and no tendency for easy bruising    Past Medical History:   Diagnosis Date    Other specified health status     No pertinent past medical history        Past Surgical History:   Procedure Laterality Date    HERNIA REPAIR  07/22/2021    Hernia Repair    WISDOM TOOTH EXTRACTION            Current Outpatient Medications:     atorvastatin " (Lipitor) 40 mg tablet, TAKE 1 TABLET BY MOUTH EVERY DAY, Disp: 90 tablet, Rfl: 3    finasteride (Propecia) 1 mg tablet, TAKE 1 TABLET BY MOUTH EVERY DAY, Disp: 30 tablet, Rfl: 0    minocycline 100 mg capsule, Take 1 capsule (100 mg) by mouth 2 times a day as needed (acne)., Disp: 60 capsule, Rfl: 2    zolpidem CR (Ambien CR) 6.25 mg ER tablet, Take 1 tablet (6.25 mg) by mouth as needed at bedtime for sleep. Do not crush, chew, or split., Disp: 30 tablet, Rfl: 0     No Known Allergies     Social Connections: Not on file          Objective   39 year-old male in no acute distress. Well nourished. Normal affect. Alert and oriented x 3.   Gait: Normal Tandem. Neutral alignment. Able to perform single leg stance. No abnormalities of balance or coordination.  Skin: Intact over the bilateral upper and lower extremities. No erythema, ecchymosis, or temperature changes.  Negative bilateral popliteal lymphadenopathy.  Right Knee:  ROM: 0-130 degrees. Negative crepitus.  Mild effusion.  Good quadriceps contraction. Intact extensor mechanism. Patellar tendon non-tender.  Patella facets non-tender. Negative apprehension. Negative tilt.   Lachman stable. Anterior drawer stable.  Posterior drawer negative.  Stable medial collateral ligament. Stable lateral collateral ligament.   Negative medial joint line tenderness.  Negative Sydnee's.  Positive lateral joint line tenderness.  Positive Sydnee's.     Left Knee:  ROM: 0-140 degrees. Negative crepitus.  No effusion.  Good quadriceps contraction. Intact extensor mechanism. Patellar tendon non-tender.  Patella facets non-tender. Negative apprehension. Negative tilt.   Lachman stable. Anterior drawer stable. Posterior drawer negative.  Stable medial collateral ligament. Stable lateral collateral ligament.   Negative medial joint line tenderness.  Negative Sydnee's.  Negative lateral joint line tenderness.  Negative Sydnee's.     Motor Strength: 5 out of 5 in the bilateral lower  extremities.  Vascular: 2+ DP/PT pulses bilaterally. Bilateral lower extremity compartments supple.      Image Results:  X-rays of the right knee dated 1/20/2025 were reviewed today.  These were negative for fracture or dislocation.      Assessment/Plan   Encounter Diagnoses:  Effusion of right knee    Injury of right knee, initial encounter    Orders Placed This Encounter    MR knee right wo IV contrast       I am concerned about a worsening tear to the lateral meniscus or potential chondral injury to the articular cartilage in his patellofemoral compartment.  I recommended an MRI of the right knee be performed to evaluate both of these as well as for any loose bodies.  The MRI is medically indicated necessary given his subjective complaints and physical exam findings including a joint effusion.  The MRI should be performed in a hospital setting so the surgeon has access to the images as it will be used for potential presurgical planning purposes.  For now he may continue with activities as tolerated but would try to limit any type of twisting motion of his knee.  He should try to ice and use over-the-counter medicines for any soreness.  After the MRIs been completed he will contact the office we will review the results with him by phone.    This office note was dictated using Dragon voice to text software and was not proofread for spelling or grammatical errors

## 2025-03-18 DIAGNOSIS — E78.5 HYPERLIPIDEMIA, UNSPECIFIED HYPERLIPIDEMIA TYPE: ICD-10-CM

## 2025-03-19 ENCOUNTER — OFFICE VISIT (OUTPATIENT)
Dept: URGENT CARE | Age: 40
End: 2025-03-19
Payer: COMMERCIAL

## 2025-03-19 VITALS
TEMPERATURE: 98.8 F | SYSTOLIC BLOOD PRESSURE: 151 MMHG | HEART RATE: 68 BPM | RESPIRATION RATE: 18 BRPM | OXYGEN SATURATION: 96 % | DIASTOLIC BLOOD PRESSURE: 93 MMHG | BODY MASS INDEX: 23.19 KG/M2 | HEIGHT: 73 IN | WEIGHT: 175 LBS

## 2025-03-19 DIAGNOSIS — J02.9 SORE THROAT: ICD-10-CM

## 2025-03-19 DIAGNOSIS — R09.81 SINUS CONGESTION: ICD-10-CM

## 2025-03-19 DIAGNOSIS — J10.1 INFLUENZA A: Primary | ICD-10-CM

## 2025-03-19 LAB
POC HUMAN RHINOVIRUS PCR: NEGATIVE
POC INFLUENZA A VIRUS PCR: POSITIVE
POC INFLUENZA B VIRUS PCR: NEGATIVE
POC RESPIRATORY SYNCYTIAL VIRUS PCR: NEGATIVE
POC STREPTOCOCCUS PYOGENES (GROUP A STREP) PCR: NEGATIVE

## 2025-03-19 PROCEDURE — 87651 STREP A DNA AMP PROBE: CPT

## 2025-03-19 PROCEDURE — 3008F BODY MASS INDEX DOCD: CPT

## 2025-03-19 PROCEDURE — 99213 OFFICE O/P EST LOW 20 MIN: CPT

## 2025-03-19 PROCEDURE — 87631 RESP VIRUS 3-5 TARGETS: CPT

## 2025-03-19 RX ORDER — ATORVASTATIN CALCIUM 40 MG/1
40 TABLET, FILM COATED ORAL DAILY
Qty: 90 TABLET | Refills: 0 | Status: SHIPPED | OUTPATIENT
Start: 2025-03-19

## 2025-03-19 RX ORDER — OSELTAMIVIR PHOSPHATE 75 MG/1
75 CAPSULE ORAL EVERY 12 HOURS
Qty: 10 CAPSULE | Refills: 0 | Status: SHIPPED | OUTPATIENT
Start: 2025-03-19 | End: 2025-03-24

## 2025-03-19 ASSESSMENT — ENCOUNTER SYMPTOMS
SORE THROAT: 1
WHEEZING: 0
SHORTNESS OF BREATH: 0
FATIGUE: 1
TROUBLE SWALLOWING: 0
FEVER: 0
HEADACHES: 1
COUGH: 1
VOMITING: 0

## 2025-03-19 NOTE — PROGRESS NOTES
Subjective   Patient ID: Ravi Subramanian is a 40 y.o. male. They present today with a chief complaint of Cough, Sore Throat, Sinusitis, and Headache (3 days).    HISTORY OF PRESENT ILLNESS:    Presents to the clinic for cough, upper respiratory congestion, headache, post-nasal drip, fatigue, and sore throat. States he initially started to feel unwell about 2-3 days ago. Denies known sick contacts. Taking Theraflu. No wheezing or dyspnea. No dysphagia or drooling.     Past Medical History  Allergies as of 03/19/2025    (No Known Allergies)       Current Outpatient Medications   Medication Instructions    atorvastatin (LIPITOR) 40 mg, oral, Daily    finasteride (PROPECIA) 1 mg, oral, Daily    minocycline 100 mg, oral, 2 times daily PRN    oseltamivir (TAMIFLU) 75 mg, oral, Every 12 hours    zolpidem CR (AMBIEN CR) 6.25 mg, oral, Nightly PRN, Do not crush, chew, or split.         Past Medical History:   Diagnosis Date    Other specified health status     No pertinent past medical history       Past Surgical History:   Procedure Laterality Date    HERNIA REPAIR  07/22/2021    Hernia Repair    WISDOM TOOTH EXTRACTION          reports that he has never smoked. He has never been exposed to tobacco smoke. He has never used smokeless tobacco. He reports current alcohol use of about 1.0 standard drink of alcohol per week. He reports that he does not use drugs.    Review of Systems    Review of Systems   Constitutional:  Positive for fatigue. Negative for fever.   HENT:  Positive for congestion, postnasal drip and sore throat. Negative for drooling and trouble swallowing.    Respiratory:  Positive for cough. Negative for shortness of breath and wheezing.    Cardiovascular:  Negative for chest pain.   Gastrointestinal:  Negative for vomiting.   Skin:  Negative for rash.   Neurological:  Positive for headaches.                                 Objective    Vitals:    03/19/25 0830   BP: (!) 151/93   Pulse: 68   Resp: 18   Temp:  "37.1 °C (98.8 °F)   TempSrc: Oral   SpO2: 96%   Weight: 79.4 kg (175 lb)   Height: 1.854 m (6' 1\")     No LMP for male patient.  PHYSICAL EXAMINATION:    Physical Exam  Vitals and nursing note reviewed.   Constitutional:       General: He is not in acute distress.     Appearance: Normal appearance. He is not ill-appearing.   HENT:      Head: Normocephalic and atraumatic.      Right Ear: Tympanic membrane, ear canal and external ear normal.      Left Ear: Tympanic membrane, ear canal and external ear normal.      Nose: Nose normal.      Mouth/Throat:      Mouth: Mucous membranes are moist.      Pharynx: Oropharynx is clear. Uvula midline. Posterior oropharyngeal erythema and postnasal drip present. No oropharyngeal exudate or uvula swelling.   Eyes:      General:         Right eye: No discharge.         Left eye: No discharge.      Extraocular Movements: Extraocular movements intact.      Conjunctiva/sclera: Conjunctivae normal.   Cardiovascular:      Rate and Rhythm: Normal rate and regular rhythm.      Heart sounds: Normal heart sounds.   Pulmonary:      Effort: Pulmonary effort is normal. No respiratory distress.      Breath sounds: Normal breath sounds. No stridor. No wheezing, rhonchi or rales.   Musculoskeletal:      Cervical back: Normal range of motion and neck supple.   Lymphadenopathy:      Cervical: No cervical adenopathy.   Skin:     General: Skin is warm and dry.      Findings: No rash.   Neurological:      General: No focal deficit present.      Mental Status: He is alert and oriented to person, place, and time.   Psychiatric:         Mood and Affect: Mood normal.         Behavior: Behavior normal.          Procedures    Results for orders placed or performed in visit on 03/19/25   POCT SPOTFIRE R/ST Panel Mini w/Strep A ([facility]) manually resulted   Result Value Ref Range    POC Group A Strep, PCR Negative Negative    POC Respiratory Syncytial Virus PCR Negative Negative    POC Influenza A Virus PCR " Positive (A) Negative    POC Influenza B Virus PCR Negative Negative    POC Human Rhinovirus PCR Negative Negative       Diagnostic study results (if any) were reviewed by Rizwana Valderrama PA-C.    Assessment/Plan   Allergies, medications, history, and pertinent labs/EKGs/Imaging reviewed by Rizwana Valderrama PA-C.     Orders and Diagnoses  Diagnoses and all orders for this visit:  Influenza A  -     oseltamivir (Tamiflu) 75 mg capsule; Take 1 capsule (75 mg) by mouth every 12 hours for 5 days.  Sore throat  -     POCT SPOTFIRE R/ST Panel Mini w/Strep A ([facility]) manually resulted  Sinus congestion  -     POCT SPOTFIRE R/ST Panel Mini w/Strep A ([facility]) manually resulted      Medical Admin Record    Given overall well appearance, vital signs, history, and exam as above, there is no indication for further emergent testing/intervention at this time.      I discussed with the patient my clinical thoughts at this time given the above and we had a shared decision-making conversation in a patient-centered decision-making model on how to proceed forward. Pt was instructed on the importance of close follow-up. They were told that an urgent care diagnosis is often a preliminary impression and that definitive care is often not able to be given in the urgent care setting. Pt was educated that close follow-up is essential for good health and good outcomes. Patient was provided with the following instructions:         May take Tamiflu as directed.       May take Sudafed for symptomatic relief of sinus congestion as needed (limit to 7 days maximum). Add guaifenesin for mucus clearance if needed.     Cool mist humidifier in room at night while sleeping. Sleep in semi elevated position.    Tea with honey, throat lozenges, vapor rub, voice rest.     Tylenol or ibuprofen for fevers/pain if needed.      Plenty of fluids and rest.      Good hand hygiene.      Follow up with PCP or RTC in the next 7 days if sx fail to show adequate  improvement.        Clinical impression as well as limitations of available testing/examination, all discussed with patient. Pt is well at this time in the urgent care. They are comfortable with the present assessment and plan to be discharged home. Discussed with them close outpatient follow up, reassessment, and possible further testing/treatment via their PCP/specialist if symptoms fail to improve; they agree, understand, and are comfortable with this plan. Pt given the opportunity to ask questions prior to discharge and all questions were answered at this time. Via our discussion, the patient was advised of warning signs and instructions were reviewed. Strict ED precautions were given, acknowledged, and understood. Discussed with the patient/family that it is okay to return to the urgent care at any time for anything. Advised to present to the ED if present condition changes/worsens or if they develop new symptoms at any time after discharge. Also, advised to go to the ED if they cannot establish outpatient follow-up in time frame specified above. Pt verbalized understanding and agreement with plan and instructions. Discussed the need for close follow up with their primary care provider and/or specialist for further testing/treatment/care/consultation in the short time frame as noted above, if needed - they understand these instructions and agree to close follow up for these reasons. Patient discharged home in stable condition.      Follow Up Instructions  No follow-ups on file.    Patient disposition: Home    Electronically signed by Rizwana Valderrama PA-C  9:16 AM

## 2025-03-27 ENCOUNTER — HOSPITAL ENCOUNTER (OUTPATIENT)
Dept: RADIOLOGY | Facility: CLINIC | Age: 40
Discharge: HOME | End: 2025-03-27
Payer: COMMERCIAL

## 2025-03-27 DIAGNOSIS — L65.9 NONSCARRING HAIR LOSS, UNSPECIFIED: ICD-10-CM

## 2025-03-27 DIAGNOSIS — M25.461 EFFUSION OF RIGHT KNEE: ICD-10-CM

## 2025-03-27 PROCEDURE — 73721 MRI JNT OF LWR EXTRE W/O DYE: CPT | Mod: RT

## 2025-03-27 RX ORDER — FINASTERIDE 1 MG/1
1 TABLET, FILM COATED ORAL DAILY
Qty: 90 TABLET | Refills: 0 | Status: SHIPPED | OUTPATIENT
Start: 2025-03-27

## 2025-03-28 ENCOUNTER — DOCUMENTATION (OUTPATIENT)
Dept: ORTHOPEDIC SURGERY | Facility: HOSPITAL | Age: 40
End: 2025-03-28
Payer: COMMERCIAL

## 2025-03-28 NOTE — PROGRESS NOTES
I spoke with Ravi regarding the MRI on his right knee.  He states that his symptoms are about the same from when he was last seen in the office.    The MRI demonstrated tears to both the medial and lateral meniscus.  There is a parameniscal cyst present laterally.  Cruciate and collateral ligaments appear intact.    Since he continues to be symptomatic and activity is limited we are going to proceed with right knee arthroscopic partial medial and lateral meniscectomy as well as potential open parameniscal cyst decompression.  Risks and benefits of surgery and the usual postoperative course were reviewed with him today.  He verbalized understanding would like to proceed.    Knee scope risks:    Risks of knee arthroscopy were discussed with the patient at length. These include but are not limited to: Cardiovascular compromise, anesthetic complications, infection, bleeding, neurovascular injury, blood clots, persistent pain, and stiffness.  There is a risk of progression of degenerative changes within the knee.  Some patients have difficulty returning to their preinjury level of activity.  The postoperative course regarding the use of medications, crutches, possible brace, care for the incision, and physical therapy were also outlined. The patient voices understanding of these risks and postoperative course

## 2025-04-15 ENCOUNTER — PREP FOR PROCEDURE (OUTPATIENT)
Dept: ORTHOPEDIC SURGERY | Facility: HOSPITAL | Age: 40
End: 2025-04-15
Payer: COMMERCIAL

## 2025-04-15 DIAGNOSIS — S83.231D COMPLEX TEAR OF MEDIAL MENISCUS OF RIGHT KNEE, SUBSEQUENT ENCOUNTER: ICD-10-CM

## 2025-04-15 DIAGNOSIS — M23.006 PERIMENISCAL CYST OF RIGHT KNEE: ICD-10-CM

## 2025-04-15 DIAGNOSIS — S83.271D COMPLEX TEAR OF LATERAL MENISCUS OF RIGHT KNEE, SUBSEQUENT ENCOUNTER: ICD-10-CM

## 2025-04-17 ASSESSMENT — PROMIS GLOBAL HEALTH SCALE
CARRYOUT_PHYSICAL_ACTIVITIES: COMPLETELY
RATE_SOCIAL_SATISFACTION: GOOD
RATE_AVERAGE_FATIGUE: MILD
RATE_AVERAGE_PAIN: 3
RATE_MENTAL_HEALTH: GOOD
RATE_GENERAL_HEALTH: VERY GOOD
RATE_PHYSICAL_HEALTH: VERY GOOD
RATE_QUALITY_OF_LIFE: VERY GOOD
CARRYOUT_SOCIAL_ACTIVITIES: VERY GOOD
EMOTIONAL_PROBLEMS: SOMETIMES

## 2025-04-24 ENCOUNTER — APPOINTMENT (OUTPATIENT)
Facility: CLINIC | Age: 40
End: 2025-04-24
Payer: COMMERCIAL

## 2025-04-24 VITALS
DIASTOLIC BLOOD PRESSURE: 84 MMHG | HEIGHT: 73 IN | TEMPERATURE: 97.8 F | SYSTOLIC BLOOD PRESSURE: 122 MMHG | HEART RATE: 67 BPM | BODY MASS INDEX: 24.25 KG/M2 | RESPIRATION RATE: 18 BRPM | OXYGEN SATURATION: 98 % | WEIGHT: 183 LBS

## 2025-04-24 DIAGNOSIS — Z13.220 LIPID SCREENING: ICD-10-CM

## 2025-04-24 DIAGNOSIS — L70.9 ACNE, UNSPECIFIED ACNE TYPE: ICD-10-CM

## 2025-04-24 DIAGNOSIS — L70.0 ACNE VULGARIS: ICD-10-CM

## 2025-04-24 DIAGNOSIS — Z87.09 HISTORY OF ASTHMA: ICD-10-CM

## 2025-04-24 DIAGNOSIS — Z12.5 PROSTATE CANCER SCREENING: ICD-10-CM

## 2025-04-24 DIAGNOSIS — Z00.00 HEALTHCARE MAINTENANCE: Primary | ICD-10-CM

## 2025-04-24 DIAGNOSIS — F90.0 ATTENTION DEFICIT HYPERACTIVITY DISORDER (ADHD), PREDOMINANTLY INATTENTIVE TYPE: ICD-10-CM

## 2025-04-24 DIAGNOSIS — L64.9 MALE PATTERN ALOPECIA: ICD-10-CM

## 2025-04-24 DIAGNOSIS — G47.00 INSOMNIA, UNSPECIFIED TYPE: ICD-10-CM

## 2025-04-24 DIAGNOSIS — R03.0 ELEVATED BLOOD PRESSURE READING: ICD-10-CM

## 2025-04-24 DIAGNOSIS — J20.9 ACUTE BRONCHITIS, UNSPECIFIED ORGANISM: ICD-10-CM

## 2025-04-24 DIAGNOSIS — R73.9 HYPERGLYCEMIA: ICD-10-CM

## 2025-04-24 DIAGNOSIS — E78.5 HYPERLIPIDEMIA, UNSPECIFIED HYPERLIPIDEMIA TYPE: ICD-10-CM

## 2025-04-24 DIAGNOSIS — Z12.83 SKIN CANCER SCREENING: ICD-10-CM

## 2025-04-24 PROCEDURE — 81002 URINALYSIS NONAUTO W/O SCOPE: CPT | Performed by: FAMILY MEDICINE

## 2025-04-24 PROCEDURE — 99395 PREV VISIT EST AGE 18-39: CPT | Performed by: FAMILY MEDICINE

## 2025-04-24 PROCEDURE — 3008F BODY MASS INDEX DOCD: CPT | Performed by: FAMILY MEDICINE

## 2025-04-24 PROCEDURE — 90471 IMMUNIZATION ADMIN: CPT | Performed by: FAMILY MEDICINE

## 2025-04-24 PROCEDURE — 99214 OFFICE O/P EST MOD 30 MIN: CPT | Performed by: FAMILY MEDICINE

## 2025-04-24 PROCEDURE — 90651 9VHPV VACCINE 2/3 DOSE IM: CPT | Performed by: FAMILY MEDICINE

## 2025-04-24 RX ORDER — MINOCYCLINE HYDROCHLORIDE 100 MG/1
100 CAPSULE ORAL 2 TIMES DAILY PRN
Qty: 60 CAPSULE | Refills: 2 | Status: SHIPPED | OUTPATIENT
Start: 2025-04-24

## 2025-04-24 RX ORDER — DEXTROAMPHETAMINE SACCHARATE, AMPHETAMINE ASPARTATE, DEXTROAMPHETAMINE SULFATE AND AMPHETAMINE SULFATE 7.5; 7.5; 7.5; 7.5 MG/1; MG/1; MG/1; MG/1
30 TABLET ORAL DAILY
COMMUNITY
Start: 2024-04-01

## 2025-04-24 RX ORDER — ZOLPIDEM TARTRATE 6.25 MG/1
6.25 TABLET, FILM COATED, EXTENDED RELEASE ORAL NIGHTLY PRN
Qty: 30 TABLET | Refills: 0 | Status: SHIPPED | OUTPATIENT
Start: 2025-04-24 | End: 2025-05-24

## 2025-04-24 RX ORDER — AZITHROMYCIN 250 MG/1
TABLET, FILM COATED ORAL
Qty: 6 TABLET | Refills: 0 | Status: SHIPPED | OUTPATIENT
Start: 2025-04-24 | End: 2025-04-29

## 2025-04-24 RX ORDER — ALBUTEROL SULFATE 90 UG/1
2 INHALANT RESPIRATORY (INHALATION) EVERY 6 HOURS PRN
Qty: 18 G | Refills: 0 | Status: SHIPPED | OUTPATIENT
Start: 2025-04-24

## 2025-04-24 RX ORDER — METHYLPREDNISOLONE 4 MG/1
TABLET ORAL
Qty: 21 TABLET | Refills: 0 | Status: SHIPPED | OUTPATIENT
Start: 2025-04-24 | End: 2025-04-30

## 2025-04-24 ASSESSMENT — ENCOUNTER SYMPTOMS
HEADACHES: 0
SHORTNESS OF BREATH: 0

## 2025-04-24 ASSESSMENT — PATIENT HEALTH QUESTIONNAIRE - PHQ9
SUM OF ALL RESPONSES TO PHQ9 QUESTIONS 1 AND 2: 0
1. LITTLE INTEREST OR PLEASURE IN DOING THINGS: NOT AT ALL
2. FEELING DOWN, DEPRESSED OR HOPELESS: NOT AT ALL

## 2025-04-24 NOTE — ASSESSMENT & PLAN NOTE
On ambien as needed.  Last rx refill was in march 2024.  He takes it sparingly.  I have personally reviewed the OARRS report today for this patient and it is consistent with the prescribed therapy.  We weighed the risks and benefits of this therapy.  I have considered the risk of abuse, dependence, addiction, and diversion.  I will continue with the current treatment plan    Orders:    zolpidem CR (Ambien CR) 6.25 mg ER tablet; Take 1 tablet (6.25 mg) by mouth as needed at bedtime for sleep. Do not crush, chew, or split.

## 2025-04-24 NOTE — PROGRESS NOTES
"Subjective     Ravi Subramanian is a 40 y.o. male who presents for Annual Exam.    HPI     Pt is here today for annual well exam.     Pt has adhd, on adderall 30 mg daily, managed by psychiatry    No history of hypertension.  He occasionally checks his bp with his girlfriend's BP monitor at he reports it is normal     Hx of HLD, on statin daily.  Tolerating well.     He has insomnia, takes ambien nightly as needed.  He takes it 2-3 times a month.      Pt will be having a right knee arthroscopy on 5/2 with  ortho due to meniscal tear.      Review of Systems   Respiratory:  Negative for shortness of breath.    Cardiovascular:  Negative for chest pain.   Neurological:  Negative for headaches.       Objective     Vitals:    04/24/25 1112 04/24/25 1146   BP: 134/88 122/84   BP Location: Left arm    Patient Position: Sitting    Pulse: 67    Resp: 18    Temp: 36.6 °C (97.8 °F)    TempSrc: Temporal    SpO2: 98%    Weight: 83 kg (183 lb)    Height: 1.854 m (6' 1\")         Current Outpatient Medications   Medication Instructions    albuterol 90 mcg/actuation inhaler 2 puffs, inhalation, Every 6 hours PRN    amphetamine-dextroamphetamine (AdderalL) 30 mg tablet 30 mg, Daily    atorvastatin (LIPITOR) 40 mg, oral, Daily    azithromycin (Zithromax) 250 mg tablet Take 2 tablets (500 mg) by mouth once daily for 1 day, THEN 1 tablet (250 mg) once daily for 4 days.    finasteride (PROPECIA) 1 mg, oral, Daily    methylPREDNISolone (Medrol Dospak) 4 mg tablets Take as directed on package.    minocycline 100 mg, oral, 2 times daily PRN    zolpidem CR (AMBIEN CR) 6.25 mg, oral, Nightly PRN, Do not crush, chew, or split.        RX Allergies[1]     Surgical History[2]     Social History[3]     Family History[4]     Immunization History   Administered Date(s) Administered    Flu vaccine (IIV4), preservative free *Check age/dose* 11/13/2020, 10/19/2022    Flu vaccine, quadrivalent, no egg protein, age 6 month or greater (FLUCELVAX) 11/08/2021 "    Flu vaccine, trivalent, preservative free, age 6 months and greater (Fluarix/Fluzone/Flulaval) 12/21/2024    Influenza, Unspecified 09/26/2023    Influenza, seasonal, injectable 11/08/2021    Moderna SARS-CoV-2 Vaccination 11/11/2021    Pfizer COVID-19 vaccine, 12 years and older, (30mcg/0.3mL) (Comirnaty) 10/16/2023, 12/21/2024    Pfizer COVID-19 vaccine, bivalent, age 12 years and older (30 mcg/0.3 mL) 09/07/2022    Pfizer Purple Cap SARS-CoV-2 03/18/2021, 04/09/2021    Tdap vaccine, age 7 year and older (BOOSTRIX, ADACEL) 07/22/2021        Lab Results   Component Value Date    WBC 7.9 03/12/2024    RBC 4.59 03/12/2024    HGB 14.1 03/12/2024    HCT 43.8 03/12/2024    MCV 95 03/12/2024    MCH 30.7 03/12/2024    MCHC 32.2 03/12/2024     03/12/2024     Lab Results   Component Value Date    GLUCOSE 85 03/12/2024     03/12/2024    K 4.7 03/12/2024     03/12/2024    CO2 28 03/12/2024    ANIONGAP 13 03/12/2024    BUN 17 03/12/2024    CREATININE 1.19 03/12/2024    CALCIUM 9.8 03/12/2024    ALBUMIN 4.5 03/12/2024    ALKPHOS 60 03/12/2024    PROT 6.6 03/12/2024    AST 26 03/12/2024    BILITOT 0.8 03/12/2024    ALT 17 03/12/2024      Lab Results   Component Value Date    CHOL 176 03/12/2024    HDL 57.8 03/12/2024    CHHDL 3.0 03/12/2024    LDLCALC 107 03/12/2024    VLDL 12 03/12/2024    TRIG 58 03/12/2024      Lab Results   Component Value Date    TSH 2.40 03/12/2024      Lab Results   Component Value Date    VITD25 32 03/12/2024      Lab Results   Component Value Date    HGBA1C 5.2 03/12/2024    NTMPNAGD2B 103 03/12/2024       Physical Exam  Vitals reviewed.   Constitutional:       General: He is not in acute distress.     Appearance: Normal appearance. He is not ill-appearing.   HENT:      Head: Normocephalic and atraumatic.      Right Ear: Tympanic membrane, ear canal and external ear normal.      Left Ear: Tympanic membrane, ear canal and external ear normal.      Mouth/Throat:      Mouth: Mucous  membranes are moist.      Pharynx: No oropharyngeal exudate or posterior oropharyngeal erythema.   Neck:      Thyroid: No thyroid mass or thyromegaly.   Cardiovascular:      Rate and Rhythm: Normal rate and regular rhythm.      Heart sounds: No murmur heard.  Pulmonary:      Effort: No respiratory distress.      Breath sounds: Normal breath sounds. No wheezing, rhonchi or rales.   Abdominal:      General: There is no distension.      Palpations: Abdomen is soft.      Tenderness: There is no abdominal tenderness.   Lymphadenopathy:      Cervical: No cervical adenopathy.   Skin:     General: Skin is warm and dry.      Findings: Acne present. No rash.      Comments: A few areas of acne noted on face    Neurological:      Mental Status: He is alert and oriented to person, place, and time. Mental status is at baseline.   Psychiatric:         Mood and Affect: Mood normal.         Behavior: Behavior normal.         Assessment & Plan  Healthcare maintenance  Well Exam     Vaccines - utd with tdap.      HPV #1 given today.    I recommend yearly flu vaccine and routine COVID vaccinations as indicated     Complete labs    Healthy diet, routine exercise was discussed with patient      Orders:    POCT UA (nonautomated) manually resulted    CBC and Auto Differential; Future    Comprehensive Metabolic Panel; Future    Lipid Panel; Future    Hemoglobin A1C; Future    HPV 9-valent vaccine (GARDASIL 9)    Hyperlipidemia, unspecified hyperlipidemia type  On statin   Orders:    Comprehensive Metabolic Panel; Future    Lipid Panel; Future    Attention deficit hyperactivity disorder (ADHD), predominantly inattentive type  Sees telehealth psychiatry specialist - on adderall 30 mg daily.         Acne vulgaris  On minocycline as needed   Orders:    Referral to Dermatology    Insomnia, unspecified type  On ambien as needed.  Last rx refill was in march 2024.  He takes it sparingly.  I have personally reviewed the OARRS report today for this  patient and it is consistent with the prescribed therapy.  We weighed the risks and benefits of this therapy.  I have considered the risk of abuse, dependence, addiction, and diversion.  I will continue with the current treatment plan    Orders:    zolpidem CR (Ambien CR) 6.25 mg ER tablet; Take 1 tablet (6.25 mg) by mouth as needed at bedtime for sleep. Do not crush, chew, or split.    Elevated blood pressure reading  Home bp readings are normal.    Recheck BP was normal       Lipid screening    Orders:    Lipid Panel; Future    Hyperglycemia    Orders:    Hemoglobin A1C; Future    Prostate cancer screening    Orders:    Prostate Specific Antigen; Future    Male pattern alopecia  On finasteride daily  Orders:    Prostate Specific Antigen; Future    History of asthma  Mild, rare albuterol inhaler use.   Orders:    albuterol 90 mcg/actuation inhaler; Inhale 2 puffs every 6 hours if needed for wheezing.    Acute bronchitis, unspecified organism  Wheezing, rhonchi on exam today - pt reports productive cough x 1 week.  Hx of childhood asthma.  I will prescribe zpak, medrol, albuterol inhaler as needed.  The goals of therapy, medication dose, frequency and potential side effects were discussed with patient today.  The patient is agreeable to taking the medication as prescribed.     Orders:    azithromycin (Zithromax) 250 mg tablet; Take 2 tablets (500 mg) by mouth once daily for 1 day, THEN 1 tablet (250 mg) once daily for 4 days.    methylPREDNISolone (Medrol Dospak) 4 mg tablets; Take as directed on package.    albuterol 90 mcg/actuation inhaler; Inhale 2 puffs every 6 hours if needed for wheezing.    Acne, unspecified acne type  Controlled on minocycline as needed   Orders:    minocycline 100 mg capsule; Take 1 capsule (100 mg) by mouth 2 times a day as needed (acne).    Skin cancer screening    Orders:    Referral to Dermatology                        [1] No Known Allergies  [2]   Past Surgical History:  Procedure  Laterality Date    HERNIA REPAIR  07/22/2021    Hernia Repair    WISDOM TOOTH EXTRACTION     [3]   Social History  Tobacco Use    Smoking status: Never     Passive exposure: Never    Smokeless tobacco: Never   Vaping Use    Vaping status: Never Used   Substance Use Topics    Alcohol use: Yes     Alcohol/week: 1.0 standard drink of alcohol     Types: 1 Standard drinks or equivalent per week    Drug use: Never   [4]   Family History  Problem Relation Name Age of Onset    Arthritis Mother      Sleep apnea Father      Anxiety disorder Brother      Anxiety disorder Maternal Grandmother      Prostate cancer Maternal Grandfather      COPD Other grandmother

## 2025-04-28 ENCOUNTER — TELEPHONE (OUTPATIENT)
Dept: DERMATOLOGY | Facility: CLINIC | Age: 40
End: 2025-04-28
Payer: COMMERCIAL

## 2025-04-28 NOTE — TELEPHONE ENCOUNTER
Called patient and left a VM to apologize for the scheduling error that happened last week and to offer a sooner appointment with one of our providers. Left office number [phone] to call back to schedule.

## 2025-04-30 ENCOUNTER — APPOINTMENT (OUTPATIENT)
Dept: DERMATOLOGY | Facility: CLINIC | Age: 40
End: 2025-04-30
Payer: COMMERCIAL

## 2025-05-01 ENCOUNTER — ANESTHESIA EVENT (OUTPATIENT)
Dept: OPERATING ROOM | Facility: HOSPITAL | Age: 40
End: 2025-05-01
Payer: COMMERCIAL

## 2025-05-01 LAB
ALBUMIN SERPL-MCNC: 4.7 G/DL (ref 3.6–5.1)
ALP SERPL-CCNC: 55 U/L (ref 36–130)
ALT SERPL-CCNC: 12 U/L (ref 9–46)
ANION GAP SERPL CALCULATED.4IONS-SCNC: 9 MMOL/L (CALC) (ref 7–17)
AST SERPL-CCNC: 15 U/L (ref 10–40)
BASOPHILS # BLD AUTO: 48 CELLS/UL (ref 0–200)
BASOPHILS NFR BLD AUTO: 0.8 %
BILIRUB SERPL-MCNC: 0.8 MG/DL (ref 0.2–1.2)
BUN SERPL-MCNC: 15 MG/DL (ref 7–25)
CALCIUM SERPL-MCNC: 9.7 MG/DL (ref 8.6–10.3)
CHLORIDE SERPL-SCNC: 103 MMOL/L (ref 98–110)
CHOLEST SERPL-MCNC: 149 MG/DL
CHOLEST/HDLC SERPL: 2.7 (CALC)
CO2 SERPL-SCNC: 28 MMOL/L (ref 20–32)
CREAT SERPL-MCNC: 1.11 MG/DL (ref 0.6–1.26)
EGFRCR SERPLBLD CKD-EPI 2021: 87 ML/MIN/1.73M2
EOSINOPHIL # BLD AUTO: 108 CELLS/UL (ref 15–500)
EOSINOPHIL NFR BLD AUTO: 1.8 %
ERYTHROCYTE [DISTWIDTH] IN BLOOD BY AUTOMATED COUNT: 11.8 % (ref 11–15)
EST. AVERAGE GLUCOSE BLD GHB EST-MCNC: 100 MG/DL
EST. AVERAGE GLUCOSE BLD GHB EST-SCNC: 5.5 MMOL/L
GLUCOSE SERPL-MCNC: 86 MG/DL (ref 65–99)
HBA1C MFR BLD: 5.1 %
HCT VFR BLD AUTO: 41.2 % (ref 38.5–50)
HDLC SERPL-MCNC: 55 MG/DL
HGB BLD-MCNC: 14 G/DL (ref 13.2–17.1)
LDLC SERPL CALC-MCNC: 81 MG/DL (CALC)
LYMPHOCYTES # BLD AUTO: 2682 CELLS/UL (ref 850–3900)
LYMPHOCYTES NFR BLD AUTO: 44.7 %
MCH RBC QN AUTO: 31.6 PG (ref 27–33)
MCHC RBC AUTO-ENTMCNC: 34 G/DL (ref 32–36)
MCV RBC AUTO: 93 FL (ref 80–100)
MONOCYTES # BLD AUTO: 534 CELLS/UL (ref 200–950)
MONOCYTES NFR BLD AUTO: 8.9 %
NEUTROPHILS # BLD AUTO: 2628 CELLS/UL (ref 1500–7800)
NEUTROPHILS NFR BLD AUTO: 43.8 %
NONHDLC SERPL-MCNC: 94 MG/DL (CALC)
PLATELET # BLD AUTO: 303 THOUSAND/UL (ref 140–400)
PMV BLD REES-ECKER: 9.9 FL (ref 7.5–12.5)
POTASSIUM SERPL-SCNC: 3.9 MMOL/L (ref 3.5–5.3)
PROT SERPL-MCNC: 6.8 G/DL (ref 6.1–8.1)
PSA SERPL-MCNC: 0.28 NG/ML
RBC # BLD AUTO: 4.43 MILLION/UL (ref 4.2–5.8)
SODIUM SERPL-SCNC: 140 MMOL/L (ref 135–146)
TRIGL SERPL-MCNC: 53 MG/DL
WBC # BLD AUTO: 6 THOUSAND/UL (ref 3.8–10.8)

## 2025-05-01 NOTE — ANESTHESIA PREPROCEDURE EVALUATION
Patient: Ravi Subramanian    Procedure Information       Date/Time: 05/02/25 1030    Procedure: Right Knee Arthroscopic Partial Medial & Lateral Meniscectomy; Open Perimeniscal Cyst Decompression (Right: Knee) - LMA    Location: [facility] OR 11 / Virtual [facility] A OR    Surgeons: Ravi Mcmullen MD            Relevant Problems   Cardiac   (+) Hyperlipidemia      Neuro   (+) Anxiety   (+) Right cervical radiculopathy       Clinical information reviewed:                   NPO Detail:  No data recorded     Physical Exam    Airway  Mallampati: II     Cardiovascular   Rhythm: regular  Rate: normal     Dental    Pulmonary    Abdominal                                                            Pre-Anesthesia Evaluation      Ravi Subramanian is a 40 y.o. male who presents for the above mentioned procedure due to Complex tear of medial meniscus of right knee, subsequent encounter [S83.231D];Complex tear of lateral meniscus of right knee, subsequent encounter [S83.271D];Perimeniscal cyst of right knee [M23.006]       Medical History[1]  Surgical History[2]  Social History[3]  RX Allergies[4]  Current Medications[5]  Prior to Admission medications    Medication Sig Start Date End Date Taking? Authorizing Provider   albuterol 90 mcg/actuation inhaler Inhale 2 puffs every 6 hours if needed for wheezing. 4/24/25   Liban Tapia,    amphetamine-dextroamphetamine (AdderalL) 30 mg tablet Take 1 tablet (30 mg) by mouth once daily. 4/1/24   Historical Provider, MD   atorvastatin (Lipitor) 40 mg tablet TAKE 1 TABLET BY MOUTH EVERY DAY 3/19/25   Liban Tapia DO   azithromycin (Zithromax) 250 mg tablet Take 2 tablets (500 mg) by mouth once daily for 1 day, THEN 1 tablet (250 mg) once daily for 4 days. 4/24/25 4/29/25  Liban Tapia DO   finasteride (Propecia) 1 mg tablet TAKE 1 TABLET BY MOUTH EVERY DAY 3/27/25   Liban Tapia DO   methylPREDNISolone (Medrol Dospak) 4 mg tablets Take as directed on package. 4/24/25 4/30/25  Liban SINGH  "Gumucio, DO   minocycline 100 mg capsule Take 1 capsule (100 mg) by mouth 2 times a day as needed (acne). 4/24/25   Liban Tapia, DO   zolpidem CR (Ambien CR) 6.25 mg ER tablet Take 1 tablet (6.25 mg) by mouth as needed at bedtime for sleep. Do not crush, chew, or split. 4/24/25 5/24/25  Liban Tapia, DO     No medication comments found.   Visit Vitals  Smoking Status Never     Lab Results   Component Value Date    WBC 6.0 04/30/2025    HGB 14.0 04/30/2025    HCT 41.2 04/30/2025     04/30/2025     Lab Results   Component Value Date    TSH 2.40 03/12/2024    HGBA1C 5.1 04/30/2025    GLUCOSE 86 04/30/2025     04/30/2025    K 3.9 04/30/2025     04/30/2025    CREATININE 1.11 04/30/2025    BUN 15 04/30/2025    EGFR 87 04/30/2025    CO2 28 04/30/2025    AST 15 04/30/2025    ALT 12 04/30/2025     No results found for: \"STAPHMRSASCR\"  No results found for this or any previous visit (from the past 4464 hours).  No results found for this or any previous visit from the past 62682 days.    No results found for this or any previous visit from the past 1095 days.     No results found for: \"EF\"  No results found for: \"PREGTESTUR\", \"PREGSERUM\", \"HCG\", \"HCGQUANT\"                             Anesthesia Plan    History of general anesthesia?: yes  History of complications of general anesthesia?: no    ASA 2     general     intravenous induction   Anesthetic plan and risks discussed with patient.    Plan discussed with CRNA and CAA.           [1]  Past Medical History:  Diagnosis Date   • Other specified health status     No pertinent past medical history   [2]  Past Surgical History:  Procedure Laterality Date   • HERNIA REPAIR  07/22/2021    Hernia Repair   • WISDOM TOOTH EXTRACTION     [3]  Social History  Tobacco Use   • Smoking status: Never     Passive exposure: Never   • Smokeless tobacco: Never   Vaping Use   • Vaping status: Never Used   Substance Use Topics   • Alcohol use: Yes     Alcohol/week: 1.0 " standard drink of alcohol     Types: 1 Standard drinks or equivalent per week   • Drug use: Never   [4]  No Known Allergies  [5]  No current facility-administered medications for this encounter.    Current Outpatient Medications:   •  albuterol 90 mcg/actuation inhaler, Inhale 2 puffs every 6 hours if needed for wheezing., Disp: 18 g, Rfl: 0  •  amphetamine-dextroamphetamine (AdderalL) 30 mg tablet, Take 1 tablet (30 mg) by mouth once daily., Disp: , Rfl:   •  atorvastatin (Lipitor) 40 mg tablet, TAKE 1 TABLET BY MOUTH EVERY DAY, Disp: 90 tablet, Rfl: 0  •  finasteride (Propecia) 1 mg tablet, TAKE 1 TABLET BY MOUTH EVERY DAY, Disp: 90 tablet, Rfl: 0  •  minocycline 100 mg capsule, Take 1 capsule (100 mg) by mouth 2 times a day as needed (acne)., Disp: 60 capsule, Rfl: 2  •  zolpidem CR (Ambien CR) 6.25 mg ER tablet, Take 1 tablet (6.25 mg) by mouth as needed at bedtime for sleep. Do not crush, chew, or split., Disp: 30 tablet, Rfl: 0

## 2025-05-02 ENCOUNTER — HOSPITAL ENCOUNTER (OUTPATIENT)
Facility: HOSPITAL | Age: 40
Setting detail: OUTPATIENT SURGERY
Discharge: HOME | End: 2025-05-02
Attending: ORTHOPAEDIC SURGERY | Admitting: ORTHOPAEDIC SURGERY
Payer: COMMERCIAL

## 2025-05-02 ENCOUNTER — ANESTHESIA (OUTPATIENT)
Dept: OPERATING ROOM | Facility: HOSPITAL | Age: 40
End: 2025-05-02
Payer: COMMERCIAL

## 2025-05-02 VITALS
SYSTOLIC BLOOD PRESSURE: 139 MMHG | HEART RATE: 68 BPM | DIASTOLIC BLOOD PRESSURE: 80 MMHG | TEMPERATURE: 97.5 F | RESPIRATION RATE: 16 BRPM | WEIGHT: 182.98 LBS | BODY MASS INDEX: 24.25 KG/M2 | OXYGEN SATURATION: 99 % | HEIGHT: 73 IN

## 2025-05-02 DIAGNOSIS — S83.231D COMPLEX TEAR OF MEDIAL MENISCUS OF RIGHT KNEE, SUBSEQUENT ENCOUNTER: Primary | ICD-10-CM

## 2025-05-02 DIAGNOSIS — S83.271D COMPLEX TEAR OF LATERAL MENISCUS OF RIGHT KNEE, SUBSEQUENT ENCOUNTER: ICD-10-CM

## 2025-05-02 DIAGNOSIS — M23.006 PERIMENISCAL CYST OF RIGHT KNEE: ICD-10-CM

## 2025-05-02 PROCEDURE — 7100000002 HC RECOVERY ROOM TIME - EACH INCREMENTAL 1 MINUTE: Performed by: ORTHOPAEDIC SURGERY

## 2025-05-02 PROCEDURE — 7100000009 HC PHASE TWO TIME - INITIAL BASE CHARGE: Performed by: ORTHOPAEDIC SURGERY

## 2025-05-02 PROCEDURE — 3700000001 HC GENERAL ANESTHESIA TIME - INITIAL BASE CHARGE: Performed by: ORTHOPAEDIC SURGERY

## 2025-05-02 PROCEDURE — 2500000004 HC RX 250 GENERAL PHARMACY W/ HCPCS (ALT 636 FOR OP/ED): Mod: JZ | Performed by: ANESTHESIOLOGY

## 2025-05-02 PROCEDURE — 7100000010 HC PHASE TWO TIME - EACH INCREMENTAL 1 MINUTE: Performed by: ORTHOPAEDIC SURGERY

## 2025-05-02 PROCEDURE — 29880 ARTHRS KNE SRG MNISECTMY M&L: CPT | Performed by: SPECIALIST/TECHNOLOGIST

## 2025-05-02 PROCEDURE — 2720000007 HC OR 272 NO HCPCS: Performed by: ORTHOPAEDIC SURGERY

## 2025-05-02 PROCEDURE — 2500000004 HC RX 250 GENERAL PHARMACY W/ HCPCS (ALT 636 FOR OP/ED): Mod: JZ | Performed by: ORTHOPAEDIC SURGERY

## 2025-05-02 PROCEDURE — 2500000004 HC RX 250 GENERAL PHARMACY W/ HCPCS (ALT 636 FOR OP/ED)

## 2025-05-02 PROCEDURE — 7100000001 HC RECOVERY ROOM TIME - INITIAL BASE CHARGE: Performed by: ORTHOPAEDIC SURGERY

## 2025-05-02 PROCEDURE — 3600000009 HC OR TIME - EACH INCREMENTAL 1 MINUTE - PROCEDURE LEVEL FOUR: Performed by: ORTHOPAEDIC SURGERY

## 2025-05-02 PROCEDURE — 2500000001 HC RX 250 WO HCPCS SELF ADMINISTERED DRUGS (ALT 637 FOR MEDICARE OP): Performed by: ANESTHESIOLOGY

## 2025-05-02 PROCEDURE — 29880 ARTHRS KNE SRG MNISECTMY M&L: CPT | Performed by: ORTHOPAEDIC SURGERY

## 2025-05-02 PROCEDURE — 3700000002 HC GENERAL ANESTHESIA TIME - EACH INCREMENTAL 1 MINUTE: Performed by: ORTHOPAEDIC SURGERY

## 2025-05-02 PROCEDURE — 3600000004 HC OR TIME - INITIAL BASE CHARGE - PROCEDURE LEVEL FOUR: Performed by: ORTHOPAEDIC SURGERY

## 2025-05-02 RX ORDER — ACETAMINOPHEN 325 MG/1
650 TABLET ORAL EVERY 4 HOURS PRN
Status: DISCONTINUED | OUTPATIENT
Start: 2025-05-02 | End: 2025-05-02 | Stop reason: HOSPADM

## 2025-05-02 RX ORDER — ONDANSETRON HYDROCHLORIDE 2 MG/ML
INJECTION, SOLUTION INTRAVENOUS AS NEEDED
Status: DISCONTINUED | OUTPATIENT
Start: 2025-05-02 | End: 2025-05-02

## 2025-05-02 RX ORDER — LIDOCAINE HYDROCHLORIDE 20 MG/ML
INJECTION, SOLUTION INFILTRATION; PERINEURAL AS NEEDED
Status: DISCONTINUED | OUTPATIENT
Start: 2025-05-02 | End: 2025-05-02

## 2025-05-02 RX ORDER — DOCUSATE SODIUM 100 MG/1
100 CAPSULE, LIQUID FILLED ORAL 2 TIMES DAILY
Qty: 30 CAPSULE | Refills: 0 | Status: SHIPPED | OUTPATIENT
Start: 2025-05-02 | End: 2025-05-17

## 2025-05-02 RX ORDER — NAPROXEN SODIUM 220 MG/1
81 TABLET, FILM COATED ORAL 2 TIMES DAILY
Qty: 28 TABLET | Refills: 0 | Status: SHIPPED | OUTPATIENT
Start: 2025-05-03 | End: 2025-05-17

## 2025-05-02 RX ORDER — CEFAZOLIN 1 G/1
INJECTION, POWDER, FOR SOLUTION INTRAVENOUS AS NEEDED
Status: DISCONTINUED | OUTPATIENT
Start: 2025-05-02 | End: 2025-05-02

## 2025-05-02 RX ORDER — SODIUM CHLORIDE, SODIUM LACTATE, POTASSIUM CHLORIDE, CALCIUM CHLORIDE 600; 310; 30; 20 MG/100ML; MG/100ML; MG/100ML; MG/100ML
100 INJECTION, SOLUTION INTRAVENOUS CONTINUOUS
Status: DISCONTINUED | OUTPATIENT
Start: 2025-05-02 | End: 2025-05-02 | Stop reason: HOSPADM

## 2025-05-02 RX ORDER — IPRATROPIUM BROMIDE 0.5 MG/2.5ML
500 SOLUTION RESPIRATORY (INHALATION) ONCE
Status: DISCONTINUED | OUTPATIENT
Start: 2025-05-02 | End: 2025-05-02 | Stop reason: HOSPADM

## 2025-05-02 RX ORDER — ONDANSETRON 4 MG/1
4 TABLET, ORALLY DISINTEGRATING ORAL EVERY 8 HOURS PRN
Qty: 30 TABLET | Refills: 0 | Status: SHIPPED | OUTPATIENT
Start: 2025-05-02 | End: 2025-05-12

## 2025-05-02 RX ORDER — ONDANSETRON HYDROCHLORIDE 2 MG/ML
4 INJECTION, SOLUTION INTRAVENOUS ONCE AS NEEDED
Status: DISCONTINUED | OUTPATIENT
Start: 2025-05-02 | End: 2025-05-02 | Stop reason: HOSPADM

## 2025-05-02 RX ORDER — BUPIVACAINE HCL/EPINEPHRINE 0.25-.0005
VIAL (ML) INJECTION AS NEEDED
Status: DISCONTINUED | OUTPATIENT
Start: 2025-05-02 | End: 2025-05-02 | Stop reason: HOSPADM

## 2025-05-02 RX ORDER — ALBUTEROL SULFATE 0.83 MG/ML
2.5 SOLUTION RESPIRATORY (INHALATION) ONCE AS NEEDED
Status: DISCONTINUED | OUTPATIENT
Start: 2025-05-02 | End: 2025-05-02 | Stop reason: HOSPADM

## 2025-05-02 RX ORDER — PROPOFOL 10 MG/ML
INJECTION, EMULSION INTRAVENOUS AS NEEDED
Status: DISCONTINUED | OUTPATIENT
Start: 2025-05-02 | End: 2025-05-02

## 2025-05-02 RX ORDER — HYDROCODONE BITARTRATE AND ACETAMINOPHEN 5; 325 MG/1; MG/1
1 TABLET ORAL EVERY 6 HOURS PRN
Qty: 20 TABLET | Refills: 0 | Status: SHIPPED | OUTPATIENT
Start: 2025-05-02

## 2025-05-02 RX ORDER — OXYCODONE HYDROCHLORIDE 5 MG/1
5 TABLET ORAL EVERY 4 HOURS PRN
Status: DISCONTINUED | OUTPATIENT
Start: 2025-05-02 | End: 2025-05-02 | Stop reason: HOSPADM

## 2025-05-02 RX ORDER — DROPERIDOL 2.5 MG/ML
0.62 INJECTION, SOLUTION INTRAMUSCULAR; INTRAVENOUS ONCE AS NEEDED
Status: DISCONTINUED | OUTPATIENT
Start: 2025-05-02 | End: 2025-05-02 | Stop reason: HOSPADM

## 2025-05-02 RX ORDER — FENTANYL CITRATE 50 UG/ML
INJECTION, SOLUTION INTRAMUSCULAR; INTRAVENOUS AS NEEDED
Status: DISCONTINUED | OUTPATIENT
Start: 2025-05-02 | End: 2025-05-02

## 2025-05-02 RX ORDER — MIDAZOLAM HYDROCHLORIDE 1 MG/ML
INJECTION INTRAMUSCULAR; INTRAVENOUS AS NEEDED
Status: DISCONTINUED | OUTPATIENT
Start: 2025-05-02 | End: 2025-05-02

## 2025-05-02 RX ORDER — SODIUM CHLORIDE, SODIUM LACTATE, POTASSIUM CHLORIDE, AND CALCIUM CHLORIDE .6; .31; .03; .02 G/100ML; G/100ML; G/100ML; G/100ML
IRRIGANT IRRIGATION AS NEEDED
Status: DISCONTINUED | OUTPATIENT
Start: 2025-05-02 | End: 2025-05-02 | Stop reason: HOSPADM

## 2025-05-02 RX ORDER — LIDOCAINE HYDROCHLORIDE 10 MG/ML
0.1 INJECTION, SOLUTION EPIDURAL; INFILTRATION; INTRACAUDAL; PERINEURAL ONCE
Status: DISCONTINUED | OUTPATIENT
Start: 2025-05-02 | End: 2025-05-02 | Stop reason: HOSPADM

## 2025-05-02 RX ORDER — HYDROMORPHONE HYDROCHLORIDE 1 MG/ML
INJECTION, SOLUTION INTRAMUSCULAR; INTRAVENOUS; SUBCUTANEOUS AS NEEDED
Status: DISCONTINUED | OUTPATIENT
Start: 2025-05-02 | End: 2025-05-02

## 2025-05-02 RX ADMIN — MIDAZOLAM HYDROCHLORIDE 2 MG: 1 INJECTION, SOLUTION INTRAMUSCULAR; INTRAVENOUS at 10:43

## 2025-05-02 RX ADMIN — HYDROMORPHONE HYDROCHLORIDE 0.2 MG: 1 INJECTION, SOLUTION INTRAMUSCULAR; INTRAVENOUS; SUBCUTANEOUS at 11:17

## 2025-05-02 RX ADMIN — OXYCODONE HYDROCHLORIDE 5 MG: 5 TABLET ORAL at 13:19

## 2025-05-02 RX ADMIN — FENTANYL CITRATE 50 MCG: 50 INJECTION, SOLUTION INTRAMUSCULAR; INTRAVENOUS at 11:05

## 2025-05-02 RX ADMIN — HYDROMORPHONE HYDROCHLORIDE 0.5 MG: 1 INJECTION, SOLUTION INTRAMUSCULAR; INTRAVENOUS; SUBCUTANEOUS at 12:54

## 2025-05-02 RX ADMIN — PROPOFOL 50 MG: 10 INJECTION, EMULSION INTRAVENOUS at 11:06

## 2025-05-02 RX ADMIN — CEFAZOLIN 2 G: 330 INJECTION, POWDER, FOR SOLUTION INTRAMUSCULAR; INTRAVENOUS at 11:09

## 2025-05-02 RX ADMIN — PROPOFOL 150 MG: 10 INJECTION, EMULSION INTRAVENOUS at 11:05

## 2025-05-02 RX ADMIN — ONDANSETRON 4 MG: 2 INJECTION, SOLUTION INTRAMUSCULAR; INTRAVENOUS at 11:15

## 2025-05-02 RX ADMIN — HYDROMORPHONE HYDROCHLORIDE 0.3 MG: 1 INJECTION, SOLUTION INTRAMUSCULAR; INTRAVENOUS; SUBCUTANEOUS at 11:38

## 2025-05-02 RX ADMIN — LIDOCAINE HYDROCHLORIDE 60 MG: 20 INJECTION, SOLUTION INFILTRATION; PERINEURAL at 11:05

## 2025-05-02 RX ADMIN — HYDROMORPHONE HYDROCHLORIDE 0.5 MG: 1 INJECTION, SOLUTION INTRAMUSCULAR; INTRAVENOUS; SUBCUTANEOUS at 12:25

## 2025-05-02 RX ADMIN — DEXAMETHASONE SODIUM PHOSPHATE 4 MG: 4 INJECTION, SOLUTION INTRAMUSCULAR; INTRAVENOUS at 11:15

## 2025-05-02 RX ADMIN — SODIUM CHLORIDE, SODIUM LACTATE, POTASSIUM CHLORIDE, AND CALCIUM CHLORIDE: .6; .31; .03; .02 INJECTION, SOLUTION INTRAVENOUS at 10:43

## 2025-05-02 ASSESSMENT — PAIN SCALES - GENERAL
PAINLEVEL_OUTOF10: 0 - NO PAIN
PAINLEVEL_OUTOF10: 3
PAINLEVEL_OUTOF10: 0 - NO PAIN
PAINLEVEL_OUTOF10: 8
PAINLEVEL_OUTOF10: 8
PAIN_LEVEL: 1
PAINLEVEL_OUTOF10: 3
PAINLEVEL_OUTOF10: 5 - MODERATE PAIN

## 2025-05-02 ASSESSMENT — PAIN - FUNCTIONAL ASSESSMENT
PAIN_FUNCTIONAL_ASSESSMENT: 0-10

## 2025-05-02 ASSESSMENT — PAIN DESCRIPTION - DESCRIPTORS
DESCRIPTORS: ACHING
DESCRIPTORS: ACHING

## 2025-05-02 ASSESSMENT — COLUMBIA-SUICIDE SEVERITY RATING SCALE - C-SSRS
1. IN THE PAST MONTH, HAVE YOU WISHED YOU WERE DEAD OR WISHED YOU COULD GO TO SLEEP AND NOT WAKE UP?: NO
2. HAVE YOU ACTUALLY HAD ANY THOUGHTS OF KILLING YOURSELF?: NO
6. HAVE YOU EVER DONE ANYTHING, STARTED TO DO ANYTHING, OR PREPARED TO DO ANYTHING TO END YOUR LIFE?: NO

## 2025-05-02 ASSESSMENT — PAIN DESCRIPTION - LOCATION
LOCATION: KNEE
LOCATION: KNEE

## 2025-05-02 ASSESSMENT — PAIN DESCRIPTION - ORIENTATION
ORIENTATION: RIGHT
ORIENTATION: RIGHT

## 2025-05-02 NOTE — OP NOTE
RIGHT Knee Arthroscopy Operative Note     Date: 2025  OR Location: [facility] OR    Name: Ravi Subramanian, : 1985, Age: 40 y.o., MRN: [id number], Sex: male    Diagnosis  Pre-op Diagnosis      * Complex tear of medial meniscus of right knee, subsequent encounter [S83.231D]     * Complex tear of lateral meniscus of right knee, subsequent encounter [S83.271D]     * Perimeniscal cyst of right knee [M23.006] Post-op Diagnosis     * Complex tear of medial meniscus of right knee, subsequent encounter [S83.231D]     * Complex tear of lateral meniscus of right knee, subsequent encounter [S83.271D]     * Perimeniscal cyst of right knee [M23.006]     Procedures  Right knee arthroscopic partial medial and lateral meniscectomy    Surgeons      * Ravi Mcmullen - Primary    Resident/Fellow/Other Assistant:  FLY Dyson    Procedure Summary  Anesthesia: General and LMA ASA: II  Anesthesia Staff: Anesthesiologist: Edgar Pineda MD  C-AA: PRABHU Mario  LEONARDO: Noel Chapman  Estimated Blood Loss: 5mL  Intra-op Medications:   Medication Name Total Dose   EPINEPHrine (Adrenalin) injection 1 mg   ceFAZolin in dextrose (iso-os) (Ancef) IVPB 2 g 2 g   fentaNYL PF (Sublimaze) injection 50 mcg 50 mcg   midazolam (Versed) injection 2 mg 2 mg          Anesthesia Record               Intraprocedure I/O Totals          Intake    LR 1200.00 mL    ceFAZolin in dextrose (iso-os) (Ancef) IVPB 2 g 100.00 mL    Total Intake 1300 mL          Specimen: No specimens collected     Staff:   Circulator: Julianne Carrasco RN  Scrub Person: Damien Gastelum     Drains and/or Catheters: None    Tourniquet Times:     Total Tourniquet Time Documented:  Thigh (Right) - 36 minutes  Total: Thigh (Right) - 36 minutes      Implants: None    Findings: Right knee horizontal tear meniscus and complex tear mid body lateral meniscus    Indications:  Ravi Subramanian is a 40 y.o. male who suffered a right knee symptomatic meniscus tear resistant to conservative measures.   The patient continues to report knee pain and swelling. Physical examination and MRI confirmed findings. Knee arthroscopy is indicated. Risks and benefits were discussed with the patient. After questions were answered consent was obtained to proceed. In the holding area of the right knee was signed as the appropriate operative site, and the patient was positively identified.     Procedure: In the operative suite the patient was placed supine on the table. An LMA was inserted by the anesthesiologist. A thigh tourniquet was placed. The lower extremity was prepped and draped in usual sterile fashion. Timeout was performed and 2 g Ancef was given intravenously prior to initiating the procedure.  The arthroscopic portal sites were identified and infiltrated with 0.25% Marcaine with epinephrine. The standard 2 portal diagnostic arthroscopy technique was utilized. The camera was inserted into the joint in an atraumatic fashion.  The suprapatellar pouch and gutters were unremarkable and free of debris.  The patellofemoral articular cartilage was intact.  The medial compartment was entered. The medial femoral condyle and tibial plateau articular cartilage was intact. The medial meniscus was probed and revealed a horizontal cleavage posterior medial meniscus tear that was not amenable to repair.    Right knee arthroscopic partial medial meniscectomy was performed by excising the unstable undersurface of the meniscus and carefully taking the unstable edge off of the superior leaflet.  The curved shaver was then used to contour the meniscus to a smooth rim.  The meniscus was probed and stable with the debridement complete.  The anterior and posterior roots remained intact.  The lateral compartment was entered. The lateral femoral condyle and tibial plateau articular cartilage was intact. The lateral meniscus was probed and revealed a complex mid body tear extending anteriorly with cystic formation.  The cyst was carefully  decompressed.  A curved biter was used to remove the unstable flaps of the meniscus.  The meniscal rim was contoured with a combination of a curved shaver and arthroscopic electrocautery.  A good rim of meniscus remained and the meniscus was stable when probed after the debridement was complete.  The anterior posterior roots were intact.  The notch was entered. The anterior cruciate ligament and PCL were intact.  The knee was then lavaged and suctioned of debris. Instruments were removed and fluid expelled. Portals closed with interrupted 3-0 nylon sutures. A Xeroform and sterile cast was applied followed by an Ace wrap. All sponge counts and needle counts were correct.   Postoperative DVT prophylaxis includes aspirin, active ankle foot pumps and early ambulation.  Enrico BILL assisted with the case.  He provided necessary and critical assistance with the procedure.  This included patient positioning, holding the arthroscope during key steps and skin closure.      Complications:  None; patient tolerated the procedure well.    Disposition: PACU - hemodynamically stable.  Condition: stable     Attending Attestation: I performed the procedure.    Ravi Mcmullen  Phone Number: [phone]

## 2025-05-02 NOTE — ANESTHESIA PROCEDURE NOTES
Airway  Date/Time: 5/2/2025 11:07 AM  Airway not difficult    Staffing  Performed: PRABHU   Authorized by: Edgar Pineda MD    Performed by: PRABHU Mario  Patient location during procedure: OR    Patient Condition  Indications for airway management: anesthesia  Patient position: sniffing  MILS maintained throughout  Sedation level: deep     Final Airway Details   Preoxygenated: yes  Final airway type: supraglottic airway  Successful airway: Supraglottic airway: igel.  Size: 4  Number of attempts at approach: 1

## 2025-05-02 NOTE — ANESTHESIA POSTPROCEDURE EVALUATION
Patient: Ravi Subramanian    Procedure Summary       Date: 05/02/25 Room / Location: [facility] OR 11 / [facility] OR    Anesthesia Start: 1043 Anesthesia Stop: 1206    Procedure: Right Knee Arthroscopic Partial Medial & Lateral Meniscectomy; Perimeniscal Cyst Decompression (Right: Knee) Diagnosis:       Complex tear of medial meniscus of right knee, subsequent encounter      Complex tear of lateral meniscus of right knee, subsequent encounter      Perimeniscal cyst of right knee      (Complex tear of medial meniscus of right knee, subsequent encounter [S83.231D])      (Complex tear of lateral meniscus of right knee, subsequent encounter [S83.271D])      (Perimeniscal cyst of right knee [M23.006])    Surgeons: Ravi Mcmullen MD Responsible Provider: Edgar Pineda MD    Anesthesia Type: general ASA Status: 2            Anesthesia Type: general    Vitals Value Taken Time   /73 05/02/25 12:06   Temp 36.4 05/02/25 12:06   Pulse 61 05/02/25 12:06   Resp 14 05/02/25 12:06   SpO2 100 % 05/02/25 12:05   Vitals shown include unfiled device data.    Anesthesia Post Evaluation    Patient location during evaluation: bedside  Patient participation: complete - patient participated  Level of consciousness: responsive to painful stimuli, responsive to verbal stimuli, sleepy but conscious, responsive to physical stimuli, responsive to light touch and responsive to noxious stimuli  Pain score: 1  Pain management: adequate  Airway patency: patent  Cardiovascular status: acceptable  Respiratory status: acceptable  Hydration status: acceptable  Postoperative Nausea and Vomiting: none        No notable events documented.

## 2025-05-02 NOTE — H&P
History Of Present Illness  Ravi Subramanian is a 40 y.o. male presenting with right knee pain occurring on 1/23/2025 while playing soccer he planted and made a cut.  His knee buckled.  He was unable to complete competition.  Was seen at the urgent care prior to the orthopedic consultation.  He states that his knee swelled significantly following injury.  He describes a popping sensation while going up and down stairs and with knee flexion.  His knee feels unstable in full extension.  He desires to remain active.     Past Medical History  He has a past medical history of ADHD, HLD (hyperlipidemia), and Other specified health status.    Surgical History  He has a past surgical history that includes Hernia repair and [place] tooth extraction.     Social History  He reports that he has never smoked. He has never been exposed to tobacco smoke. He has never used smokeless tobacco. He reports current alcohol use of about 1.0 standard drink of alcohol per week. He reports that he does not use drugs.    Family History  Family History[1]     Allergies  Patient has no known allergies.    Review of Systems  Negative unless stated in the HPI  Physical Exam   39 year-old male in no acute distress. Well nourished. Normal affect. Alert and oriented x 3.   Gait: Normal Tandem. Neutral alignment. Able to perform single leg stance. No abnormalities of balance or coordination.  Skin: Intact over the bilateral upper and lower extremities. No erythema, ecchymosis, or temperature changes.  Negative bilateral popliteal lymphadenopathy.  Right Knee:  ROM: 0-130 degrees. Negative crepitus.  Mild effusion.  Good quadriceps contraction. Intact extensor mechanism. Patellar tendon non-tender.  Patella facets non-tender. Negative apprehension. Negative tilt.   Lachman stable. Anterior drawer stable.  Posterior drawer negative.  Stable medial collateral ligament. Stable lateral collateral ligament.   Negative medial joint line tenderness.  Negative  "Sydnee's.  Positive lateral joint line tenderness.  Positive Sydnee's.     Left Knee:  ROM: 0-140 degrees. Negative crepitus.  No effusion.  Good quadriceps contraction. Intact extensor mechanism. Patellar tendon non-tender.  Patella facets non-tender. Negative apprehension. Negative tilt.   Lachman stable. Anterior drawer stable. Posterior drawer negative.  Stable medial collateral ligament. Stable lateral collateral ligament.   Negative medial joint line tenderness.  Negative Sydnee's.  Negative lateral joint line tenderness.  Negative Sydnee's.     Motor Strength: 5 out of 5 in the bilateral lower extremities.  Vascular: 2+ DP/PT pulses bilaterally. Bilateral lower extremity compartments supple.  Last Recorded Vitals  Blood pressure 137/76, pulse 60, temperature 36.6 °C (97.9 °F), temperature source Temporal, resp. rate 18, height 1.854 m (6' 1\"), weight 83 kg (182 lb 15.7 oz), SpO2 100%.    Relevant Results    X-rays of the right knee dated 1/20/2025 were reviewed today. These were negative for fracture or dislocation.     The MRI demonstrated tears to both the medial and lateral meniscus.  There is a parameniscal cyst present laterally.  Cruciate and collateral ligaments appear intact.   \  Scheduled medications  Scheduled Medications[2]  Continuous medications  Continuous Medications[3]  PRN medications  PRN Medications[4]  No results found for this or any previous visit (from the past 24 hours).    Assessment/Plan   Assessment & Plan  Complex tear of medial meniscus of right knee, subsequent encounter    Complex tear of lateral meniscus of right knee, subsequent encounter    Perimeniscal cyst of right knee      We do lengthy discussion with the patient regarding his continued conservative treatment versus surgical interventions.  He desires to remain active.  Oral anti-inflammatory medications, activity modifications and formal physical therapy have failed to adequately relieve his symptoms.  He " desires to pursue with surgical intervention.  The risk and benefits of the procedure were discussed at length with the patient and he wishes to proceed.       Yuniel Zavala PA-C         [1]   Family History  Problem Relation Name Age of Onset    Arthritis Mother      Sleep apnea Father      Anxiety disorder Brother      Anxiety disorder Maternal Grandmother      Prostate cancer Maternal Grandfather      COPD Other grandmother    [2] povidone-iodine, , Topical, Once    [3]    [4]

## 2025-05-02 NOTE — DISCHARGE INSTRUCTIONS
Ravi Mcmullen M.D.  Department of Orthopedics  [address] [place] [address]  Office: [phone]    POST OPERATIVE INSTRUCTIONS FOR KNEE ARTHROSCOPY    General Anesthesia or Sedation  You have been given medications that affect your balance and coordination for 24 hours.  Go directly home from the hospital and rest for the remainder of the day.  Have a responsible adult stay with you today and overnight.  Do not drive or operate equipment, conduct business or sign any legal documents for the next 24 hours or while taking pain medication.  Do not drink alcohol, take tranquilizers or sleeping pills for the next 24 hours or while taking pain medication.  Deep breathe and cough two times at least every 4 hours today and tomorrow while you are awake. This will help keep fevers away.   Use your CPAP or BiPAP machine whenever sleeping during the day or night.    Diet  Start a light meal and advance to your regular diet as tolerated    Dressing/Wound Care  Keep your dressing clean and dry.  You may remove your dressing in 2 days. Please remove the yellow strips as well.   You may see some spotting or drainage on your dressing, this is normal.  The purpose of the dressing is to apply pressure to the incision and to soak up any discharge or drainage from the incision.  Inspect the incision area for redness, swelling or drainage.  Leave Steri-Strips in place if present.  Apply band aids over incision.    Showering/Bathing  After the dressing has been removed, you may shower. Please cover the incisions with water proof band aids or a plastic wrap. Incisions should stay dry until sutures have been removed.  Allow soap and water to gently run over the operative area and pat dry when covered until incisions are fully healed.     Activity and Exercise  Begin  Sports Medicine leg exercises (see instruction sheet) on post op day 1.  Your weight bearing status until your follow up appointment is weight  bearing as tolerated. Use crutches for 2 - 3 days and progress your weight bearing as you feel comfortable. Do not discontinue crutches until you are able to walk comfortably without a limp.     **Physical Therapy can start 2 weeks post op. Please schedule. A PT order will be provided at your first post op appointment.    Ice/Polar Care  Apply an ice pack every 2 hours for 20 - 30 minutes while awake for the first 2 - 3 days.  Then 3 - 5 times a day for 20 minutes until seen by your surgeon.  Never place an ice pack directly on skin.  Always have a barrier such as a towel between the ice pack and your skin.  Your Polar Care unit is to be work continuously for the first 2 days postoperatively, then 3 - 5 times daily for 30 minutes until seen by your surgeon.  Refill with ice and water as needed.    Elevation  Elevating your operative extremity will lessen swelling and discomfort.    Support Hose  Wear the support hose sent home with you at all times if provided with them.  Please take the additional hose with you to the Physical Therapist or Physician office to put on your surgical leg after the dressing is removed.  You may take the hose off to hand wash and air dry, do not place them in the washer or dryer.  You will need to wear the support hose until cleared by your physician.  Wearing compression stocking, using blood thinning medication (typically aspirin) and performing ankle pumps help to prevent blood clots!    Medications  Pain medications should be taken with food.  You may experience dizziness or drowsiness while taking your prescribed pain medication.   DO NOT DRIVE!  DO NOT DRINK ALCOHOL!  Pain medication can be constipating, drink plenty of fluids and increase your fiber intake to avoid this problem.  If you develop constipation, Colace is an over the counter stool softener you may use.  New Take Home Medications - Take as directed on bottle.    Resume your prescription medications as directed by your  physician.    Do not take other medications containing Tylenol while on your pain medications.    When To Notify Your Physician  Persistent temperature greater than 101°F.  Increase or severe pain that does not respond to elevation, ice or pain medication.  Unexplained redness, swelling, numbness or tingling that does not improve with elevation or ice.  Increased amount or change in color of drainage.  Persistent nausea and vomiting.  Fingers and toes should remain pink and warm.  Notify your doctor if they become cool, grey or numb.  If you cannot reach your surgeon, seek care at an Urgent Care Center or Emergency Room.      For questions or concerns regarding your care please contact your surgeon      Dr. Ravi Mcmullen    [phone]   Yuniel Zavala PA-C    [phone]   After hours and weekends   [phone]    Post Operative Appointment:  Please call [place] at [phone] to schedule your first appointment with Ronni Yusuf PA-C/Yuniel Zavala PA-C in 10-12 days if not previously done.    PLEASE NOTE:  Additional information and instruction will be provided by your physician regarding your surgical procedure and continued care at your initial post operative office appointment               Heel Slide:   If brace is on wait on this. Sitting, pull foot towards body.    Assist stretch with hands. Hold for 5 seconds, then bend a   little bit farther and hold for another 5 seconds then relax.  Repeat 15 times, 6 times per day.           Heel Prop:  Whenever sitting, place heel on a footrest. Heel must  be high enough so your calf and thigh are off the ground.      Towel/Cord Stretch-Toe pulls:       Sitting, wrap towel or cord around foot.  Pull   With one hand to raise foot.  Stabilize your leg  by placing your other hand on your thigh. Pull  on strap with thigh muscle relaxed for 5 seconds.  Then contract thigh muscle while releasing cord  and hold heel up for 5 seconds. Repeat  sequence 10 times, 6 times  per day.      Quad Sets:   Tighten thigh muscle while pushing your knee   down into the towel.  Hold for 5 seconds then rest   for 5 seconds and repeat.  Perform 10 times, 6   times per day.    Straight Leg Raise:          Sit with back supported, or lay down. Tighten front thigh muscle, pull toe   back toward you, then lift leg 8-10   inches off surface. Pause at the top and   lower   slowly to start position. Repeat 15   times, 6 times per day. Sometimes this is easier a week after surgery.       ** Extension Habit ** When rising to stand, shift weight to involved leg and tighten thigh muscle to lock out the knee.  Hold for 10 seconds.    ** Don't forget ankle pumps throughout the day as well!!

## 2025-05-02 NOTE — BRIEF OP NOTE
Date: 2025  OR Location: [facility] OR    Name: Ravi Subramanian, : 1985, Age: 40 y.o., MRN: [id number], Sex: male    Diagnosis  Pre-op Diagnosis      * Complex tear of medial meniscus of right knee, subsequent encounter [S83.231D]     * Complex tear of lateral meniscus of right knee, subsequent encounter [S83.271D]     * Perimeniscal cyst of right knee [M23.006] Post-op Diagnosis     * Complex tear of medial meniscus of right knee, subsequent encounter [S83.231D]     * Complex tear of lateral meniscus of right knee, subsequent encounter [S83.271D]     * Perimeniscal cyst of right knee [M23.006]     Procedures  Right knee arthroscopic partial medial and lateral meniscectomy    Surgeons      * Ravi Mcmullen - Primary    Resident/Fellow/Other Assistant:  FLY Dyson    Staff:   Circulator: Julianne  Scrub Person: Damien    Anesthesia Staff: Anesthesiologist: Edgar Pineda MD  C-AA: PRABHU Mario  LEONARDO: Noel Chapman    Procedure Summary  Anesthesia: General  ASA: II  Estimated Blood Loss: 5 mL  Intra-op Medications:   Administrations occurring from 1030 to 1200 on 25:   Medication Name Total Dose   BUPivacaine-EPINEPHrine (Marcaine w/EPI) 0.25 %-1:200,000 injection 20 mL   lactated Ringer's irrigation solution 3,000 mL   ceFAZolin (Ancef) vial 1 g 2 g   dexAMETHasone (Decadron) 4 mg/mL IV Syringe 2 mL 4 mg   fentaNYL (Sublimaze) injection 50 mcg/mL 50 mcg   HYDROmorphone (Dilaudid) injection 1 mg/mL 0.5 mg   LR bolus Cannot be calculated   lidocaine (Xylocaine) injection 2 % 60 mg   midazolam PF (Versed) injection 1 mg/mL 2 mg   ondansetron (Zofran) 2 mg/mL injection 4 mg   propofol (Diprivan) injection 10 mg/mL 200 mg              Anesthesia Record               Intraprocedure I/O Totals          Intake    LR bolus 900.00 mL    Total Intake 900 mL          Specimen: No specimens collected     Findings: Horizontal tear posterior medial meniscus, complex tear mid body lateral meniscus    Complications:  None;  patient tolerated the procedure well.     Disposition: PACU - hemodynamically stable.  Condition: stable  Specimens Collected: No specimens collected  Attending Attestation: I performed the procedure.    Ravi Mcmullen  Phone Number: [phone]

## 2025-05-16 ENCOUNTER — OFFICE VISIT (OUTPATIENT)
Dept: ORTHOPEDIC SURGERY | Facility: CLINIC | Age: 40
End: 2025-05-16
Payer: COMMERCIAL

## 2025-05-16 DIAGNOSIS — S83.231D COMPLEX TEAR OF MEDIAL MENISCUS OF RIGHT KNEE, SUBSEQUENT ENCOUNTER: ICD-10-CM

## 2025-05-16 DIAGNOSIS — Z87.09 HISTORY OF ASTHMA: ICD-10-CM

## 2025-05-16 DIAGNOSIS — S83.271D COMPLEX TEAR OF LATERAL MENISCUS OF RIGHT KNEE, SUBSEQUENT ENCOUNTER: ICD-10-CM

## 2025-05-16 DIAGNOSIS — J20.9 ACUTE BRONCHITIS, UNSPECIFIED ORGANISM: ICD-10-CM

## 2025-05-16 PROCEDURE — 1036F TOBACCO NON-USER: CPT | Performed by: PHYSICIAN ASSISTANT

## 2025-05-16 PROCEDURE — 99024 POSTOP FOLLOW-UP VISIT: CPT | Performed by: PHYSICIAN ASSISTANT

## 2025-05-16 PROCEDURE — 99212 OFFICE O/P EST SF 10 MIN: CPT | Performed by: PHYSICIAN ASSISTANT

## 2025-05-16 RX ORDER — ALBUTEROL SULFATE 90 UG/1
2 INHALANT RESPIRATORY (INHALATION) EVERY 6 HOURS PRN
Qty: 18 G | Refills: 0 | Status: SHIPPED | OUTPATIENT
Start: 2025-05-16

## 2025-05-16 ASSESSMENT — PAIN SCALES - GENERAL: PAINLEVEL_OUTOF10: 3

## 2025-05-16 ASSESSMENT — PAIN - FUNCTIONAL ASSESSMENT: PAIN_FUNCTIONAL_ASSESSMENT: 0-10

## 2025-05-16 NOTE — PROGRESS NOTES
Procedure: Right knee arthroscopic partial medial and lateral meniscectomies  Date of procedure: 5/2/2025    HPI:   Ravi returns for follow up status post knee arthroscopy. Pain is minimal. Narcotics are no longer required. The patient is able to ambulate without an assistive device. Home exercises have been performed. Incisions are benign. The patient does not report any adverse events.     ROS  Constitutional: No fever, no chills, not feeling tired, no recent weight gain and no recent weight loss  ENT: No nosebleeds  Cardiovascular: No chest pain  Respiratory: No shortness of breath and no cough  Gastrointestinal: No abdominal pain, no nausea, no diarrhea, and no vomiting  Musculoskeletal: No arthralgias  Integumentary: No rashes and no skin lesions  Neurological: No headache  Psychiatric: No sleep disturbances no depression  Endocrine: No muscle weakness and no muscle cramps  Hematologic/lymphatic: No swelling glands and no tendency for easy bruising    Medical History[1]     Surgical History[2]     Current Medications[3]     RX Allergies[4]     Social Connections: Not on file         Physical exam:  40-year-old male well appearing in no acute distress  Right knee knee incisions are healed with sutures in place. No erythema or drainage. Minimal swelling. Sutures are removed and steri strips applied without complication.  Quadriceps strength is improving with slight atrophy.  Able to perform active straight leg raise.  Range of motion 0-130°.  Bilateral lower extremity compartments soft and supple.  Intact bilateral lower extremity neurovascular exam.    Diagnosis:  [ ]    Plan:  1. The surgical details and arthroscopic images were reviewed with the patient. Questions were answered regarding the procedure.  2. Physical therapy is prescribed for postoperative knee exercises, edema control, and lower extremity strength. Activity restrictions outlined.  Keep activity level to day-to-day activities for the next 2 to  3 weeks.    3. Continue to ice the knee. Utilize NSAIDs as needed for pain control and swelling. Monitor for GI distress. It is ok to shower. Avoid submerging the knee under water for another 2 weeks.  4.  We will keep an eye on the Baker's cyst that is present.  If this begins to bother him in the future we will refer him to primary care sports med for an ultrasound-guided aspiration.  5. Follow up 4 weeks. The patient agrees with this plan.    This office note was dictated using Dragon voice to text software and was not proofread for spelling or grammatical errors           [1]   Past Medical History:  Diagnosis Date    ADHD     HLD (hyperlipidemia)     Other specified health status     No pertinent past medical history   [2]   Past Surgical History:  Procedure Laterality Date    HERNIA REPAIR      Hernia Repair    WISDOM TOOTH EXTRACTION     [3]   Current Outpatient Medications:     albuterol 90 mcg/actuation inhaler, Inhale 2 puffs every 6 hours if needed for wheezing., Disp: 18 g, Rfl: 0    amphetamine-dextroamphetamine (AdderalL) 30 mg tablet, Take 1 tablet (30 mg) by mouth once daily., Disp: , Rfl:     aspirin 81 mg chewable tablet, Chew 1 tablet (81 mg) 2 times a day for 14 days. Do not fill before May 3, 2025., Disp: 28 tablet, Rfl: 0    atorvastatin (Lipitor) 40 mg tablet, TAKE 1 TABLET BY MOUTH EVERY DAY, Disp: 90 tablet, Rfl: 0    finasteride (Propecia) 1 mg tablet, TAKE 1 TABLET BY MOUTH EVERY DAY, Disp: 90 tablet, Rfl: 0    minocycline 100 mg capsule, Take 1 capsule (100 mg) by mouth 2 times a day as needed (acne)., Disp: 60 capsule, Rfl: 2    zolpidem CR (Ambien CR) 6.25 mg ER tablet, Take 1 tablet (6.25 mg) by mouth as needed at bedtime for sleep. Do not crush, chew, or split., Disp: 30 tablet, Rfl: 0  [4] No Known Allergies

## 2025-06-09 ENCOUNTER — EVALUATION (OUTPATIENT)
Dept: PHYSICAL THERAPY | Facility: CLINIC | Age: 40
End: 2025-06-09
Payer: COMMERCIAL

## 2025-06-09 DIAGNOSIS — S83.231D COMPLEX TEAR OF MEDIAL MENISCUS OF RIGHT KNEE, SUBSEQUENT ENCOUNTER: ICD-10-CM

## 2025-06-09 DIAGNOSIS — S83.271D COMPLEX TEAR OF LATERAL MENISCUS OF RIGHT KNEE, SUBSEQUENT ENCOUNTER: ICD-10-CM

## 2025-06-09 PROCEDURE — 97161 PT EVAL LOW COMPLEX 20 MIN: CPT | Mod: GP

## 2025-06-09 PROCEDURE — 97110 THERAPEUTIC EXERCISES: CPT | Mod: GP

## 2025-06-09 NOTE — PROGRESS NOTES
"Physical Therapy     Physical Therapy Evaluation        Patient Name: Ravi Subramanian  MRN: [id number]  Today's Date: 6/9/2025  Time Calculation  Start Time: 1120  Stop Time: 1201  Time Calculation (min): 41 min    Reason for Visit  Referred by: FLY Yusuf  Referral DX: right knee medial and lateral menisectomy     Insurance:  Visit: #1  Authorized: 60  Payor: [employer] / Plan: [employer] / Product Type: *No Product type* /     Diagnosis:  1. Complex tear of medial meniscus of right knee, subsequent encounter    2. Complex tear of lateral meniscus of right knee, subsequent encounter          Subjective:  Date of Onset: 5-2-25  Chief Complaint: right knee pain  QUIQUE: planted in January while playing soccer, Right knee buckled  HX: original injury occurred in January, ongoing symptoms are some time and rest; ortho seen in March with an MRI showing torn cartilage; underwent right knee menisectomy on May 2nd; \"I've been doing some exercises on my own. I've been doing the recumbent bike and it is feeling better. I still have some pain. I don't feel like I'm ready to go back to all my activities.\"     Prior level of function: no prior functional limitation  Functional limitations: limited WB, exercise  Home environment: stairs  Work status/occupation:   Recreational activity: soccer, tennis, golf, running     Patient stated goals for treatment: resume recreational activity without restriction     Reviewed medical screening history form with patient: completed 6-9-25      Precautions: no fall risk          Pain:  Location: right knee posterior, anterior  Nature: ache  Current pain: 0/10; Range: 0-6/10  Aggravating factor: WB, bending knee, descending stairs, squatting, stiffness with inactivity   Alleviating factor: rest    Objective:  Observation/Posture: minimal right knee swelling    AROM: Right knee: 0 - 142   Left knee: 0 - 150    PROM/mobility:     MMT: decreased right quad set with difficulty visually " sustaining contraction    Gait: Ambulates with step to gait pattern     Functional testing: SLR RLE with lag with repetitions    Outcome Measure:   Other Measures  Lower Extremity Funtional Score (LEFS): 55       Treatment:  Educated pt on course of therapy  Sup AROM heel slide x 10  Sup quad set x 10   SLR x 10  Sup hip flexor stretch x 60 sec x 3    OP Education: HEP: #2-5    Charges: Eval, TE x 1    Assessment:   Pt presents s/p right knee medial and lateral menisectomy. Demonstrates limited right knee ROM, quad inhibition and overall decreased function. Pt will benefit from therapy to address deficits.     Plan:  Frequency/duration: 1x/wk for 6-8 weeks  Planned interventions: Therapeutic exercise (ROM, stretching, strengthening), manual therapy, balance/gait training, education and activity modification  Rehab Potential to achieve goals: good     Goals:  Improve LEFS > 9 by 8 weeks  Improve AROM right knee flexion to 150 degrees to allow pt to squat to reach the ground by 8 weeks  Improve SLR x 10 reps RLE without lag to allow reciprocal stair negotiation by 8 weeks  Resume recreational participation without restriction by 8 weeks    Brenton Franco, PT

## 2025-06-11 ENCOUNTER — OFFICE VISIT (OUTPATIENT)
Dept: ORTHOPEDIC SURGERY | Facility: CLINIC | Age: 40
End: 2025-06-11
Payer: COMMERCIAL

## 2025-06-11 DIAGNOSIS — S83.231D COMPLEX TEAR OF MEDIAL MENISCUS OF RIGHT KNEE, SUBSEQUENT ENCOUNTER: Primary | ICD-10-CM

## 2025-06-11 DIAGNOSIS — S83.271D COMPLEX TEAR OF LATERAL MENISCUS OF RIGHT KNEE, SUBSEQUENT ENCOUNTER: ICD-10-CM

## 2025-06-11 PROCEDURE — 99024 POSTOP FOLLOW-UP VISIT: CPT | Performed by: PHYSICIAN ASSISTANT

## 2025-06-11 PROCEDURE — 1036F TOBACCO NON-USER: CPT | Performed by: PHYSICIAN ASSISTANT

## 2025-06-11 PROCEDURE — 99212 OFFICE O/P EST SF 10 MIN: CPT | Performed by: PHYSICIAN ASSISTANT

## 2025-06-11 ASSESSMENT — PAIN - FUNCTIONAL ASSESSMENT: PAIN_FUNCTIONAL_ASSESSMENT: NO/DENIES PAIN

## 2025-06-11 NOTE — PROGRESS NOTES
Subjective    Patient ID: Ravi Subramanian is a 40 y.o. male.    Procedure: Right knee arthroscopic partial medial and lateral meniscectomy  Date of surgery: 5/2/2025      HPI:  Ravi Subramanian is a 40 y.o. male with status post 6 weeks right knee arthroscopic partial medial and lateral meniscectomy.  He notes a little bit of stiffness in his knee, mainly anterior and posterior.  Typically occurs after he has been active.  He did perform 1 physical therapy session and has been performing a home exercise program.    ROS  Constitutional: No fever, no chills, not feeling tired, no recent weight gain and no recent weight loss  ENT: No nosebleeds  Cardiovascular: No chest pain  Respiratory: No shortness of breath and no cough  Gastrointestinal: No abdominal pain, no nausea, no diarrhea, and no vomiting  Musculoskeletal: No arthralgias  Integumentary: No rashes and no skin lesions  Neurological: No headache  Psychiatric: No sleep disturbances no depression  Endocrine: No muscle weakness and no muscle cramps  Hematologic/lymphatic: No swelling glands and no tendency for easy bruising      Objective   40-year-old male well appearing in no acute distress. Alert and oriented ×3.  Skin intact bilateral lower extremities.   Normal tandem gait. Coordination and balance intact.  Bilateral lower extremity compartments supple.  5 out of 5 distal motor strength bilaterally.  L4 through S1 sensation intact bilaterally.  2+ DP/PT pulses bilaterally.  Right knee incisions are healing nicely with minimal scarring.  No joint effusion.  Good quad tone.  Active range of motion 0 to 135 degrees.      Assessment/Plan   Encounter Diagnoses:  Complex tear of medial meniscus of right knee, subsequent encounter    Complex tear of lateral meniscus of right knee, subsequent encounter    No orders of the defined types were placed in this encounter.      We discussed that he can slowly progress activity as tolerated.  He will monitor for any pain or  swelling and should he experience any backoff of that activity.  He can use over-the-counter medicines and ice to help keep soreness to a minimum.  We discussed that the surgical incisions will continue to remodel over time.  Continue with his home therapy program.  We will see him back as needed.    This office note was dictated using Dragon voice to text software and was not proofread for spelling or grammatical errors

## 2025-06-13 DIAGNOSIS — E78.5 HYPERLIPIDEMIA, UNSPECIFIED HYPERLIPIDEMIA TYPE: ICD-10-CM

## 2025-06-15 RX ORDER — ATORVASTATIN CALCIUM 40 MG/1
40 TABLET, FILM COATED ORAL DAILY
Qty: 90 TABLET | Refills: 3 | Status: SHIPPED | OUTPATIENT
Start: 2025-06-15

## 2025-06-16 ENCOUNTER — APPOINTMENT (OUTPATIENT)
Dept: PHYSICAL THERAPY | Facility: CLINIC | Age: 40
End: 2025-06-16
Payer: COMMERCIAL

## 2025-06-17 ENCOUNTER — TREATMENT (OUTPATIENT)
Dept: PHYSICAL THERAPY | Facility: CLINIC | Age: 40
End: 2025-06-17
Payer: COMMERCIAL

## 2025-06-17 DIAGNOSIS — S83.271D COMPLEX TEAR OF LATERAL MENISCUS OF RIGHT KNEE, SUBSEQUENT ENCOUNTER: ICD-10-CM

## 2025-06-17 DIAGNOSIS — S83.231D COMPLEX TEAR OF MEDIAL MENISCUS OF RIGHT KNEE, SUBSEQUENT ENCOUNTER: ICD-10-CM

## 2025-06-17 PROCEDURE — 97110 THERAPEUTIC EXERCISES: CPT | Mod: GP

## 2025-06-17 NOTE — PROGRESS NOTES
"Physical Therapy    Physical Therapy Treatment    Patient Name: Ravi Subramanian  MRN: [id number]  Today's Date: 6/17/2025  Time Calculation  Start Time: 1117  Stop Time: 1202  Time Calculation (min): 45 min        Insurance:  Visit: #2  Authorized: 60  Payor: [employer] / Plan: [employer] / Product Type: *No Product type* /     Subjective:  \"I'm feeling pretty good. The swelling seems less this week. The day I had more pain was from walking a bunch of stairs.\"     Current pain: 0/10; Range: 0-4/10    Objective:  AROM: right knee: 0 - 148    Treatment:  Rec bike x 5 min  SLS with balance foam x 30 sec x 3  SLS with contralateral LE reaching x 10   Total gym leg press with angle 5 BLE 3 x 15  Sup bridge x 10   S/L hip ABD x 10     OP Education: HEP: #2-3, 5-6    Charges: TE x 3      Diagnosis:  1. Complex tear of medial meniscus of right knee, subsequent encounter    2. Complex tear of lateral meniscus of right knee, subsequent encounter        Assessment:   Pt demonstrates improved knee ROM and activity tolerance.     Plan:  Continue with focus on progressing WB activity and LE strengthening as tolerated.    Brenton Franco, PT  "

## 2025-06-27 DIAGNOSIS — L65.9 NONSCARRING HAIR LOSS, UNSPECIFIED: ICD-10-CM

## 2025-06-27 RX ORDER — FINASTERIDE 1 MG/1
1 TABLET, FILM COATED ORAL DAILY
Qty: 90 TABLET | Refills: 0 | Status: SHIPPED | OUTPATIENT
Start: 2025-06-27

## 2025-07-01 ENCOUNTER — APPOINTMENT (OUTPATIENT)
Dept: PHYSICAL THERAPY | Facility: CLINIC | Age: 40
End: 2025-07-01
Payer: COMMERCIAL

## 2025-07-01 DIAGNOSIS — S83.271D COMPLEX TEAR OF LATERAL MENISCUS OF RIGHT KNEE, SUBSEQUENT ENCOUNTER: ICD-10-CM

## 2025-07-01 DIAGNOSIS — S83.231D COMPLEX TEAR OF MEDIAL MENISCUS OF RIGHT KNEE, SUBSEQUENT ENCOUNTER: ICD-10-CM

## 2025-07-01 PROCEDURE — 97110 THERAPEUTIC EXERCISES: CPT | Mod: GP

## 2025-07-01 NOTE — PROGRESS NOTES
"Physical Therapy    Physical Therapy Treatment    Patient Name: Ravi Subramanian  MRN: [id number]  Today's Date: 7/1/2025  Time Calculation  Start Time: 1422  Stop Time: 1505  Time Calculation (min): 43 min        Insurance:  Visit: #3  Authorized: 60  Payor: [employer] / Plan: [employer] / Product Type: *No Product type* /     Subjective:  \"I'm feeling pretty good. I don't have pain with daily activities. It really only hurts if I stress it on the bike.\"     Current pain: 0/10; Range: 0-4/10    Objective:  AROM: right knee: 0 - 148    Treatment:  Rec bike x 6 min  SLS with balance foam x 30 sec x 3  SLS with contralateral LE reaching x 10   Total gym leg press with angle 5 BLE 3 x 15  SLS with hip hinge x 10 x 3  SLS with contralateral hip abd isometric x 30 sec x 3   Sup bridge x 10   S/L hip ABD x 10     OP Education: HEP: reviewed     Charges: TE x 3      Diagnosis:  1. Complex tear of medial meniscus of right knee, subsequent encounter    2. Complex tear of lateral meniscus of right knee, subsequent encounter        Assessment:   Pt demonstrates improved knee ROM and activity tolerance.     Plan:  Continue with focus on progressing WB activity and LE strengthening as tolerated.    Brenton Franco, PT  "

## 2025-07-14 ENCOUNTER — APPOINTMENT (OUTPATIENT)
Dept: PHYSICAL THERAPY | Facility: CLINIC | Age: 40
End: 2025-07-14
Payer: COMMERCIAL

## 2025-07-14 DIAGNOSIS — S83.231D COMPLEX TEAR OF MEDIAL MENISCUS OF RIGHT KNEE, SUBSEQUENT ENCOUNTER: ICD-10-CM

## 2025-07-14 DIAGNOSIS — S83.271D COMPLEX TEAR OF LATERAL MENISCUS OF RIGHT KNEE, SUBSEQUENT ENCOUNTER: ICD-10-CM

## 2025-07-14 PROCEDURE — 97110 THERAPEUTIC EXERCISES: CPT | Mod: GP

## 2025-07-14 NOTE — PROGRESS NOTES
"Physical Therapy    Physical Therapy Treatment    Patient Name: Ravi Subramanian  MRN: [id number]  Today's Date: 7/14/2025  Time Calculation  Start Time: 1512  Stop Time: 1610  Time Calculation (min): 58 min        Insurance:  Visit: #4  Authorized: 60  Payor: [employer] / Plan: [employer] / Product Type: *No Product type* /     Subjective:  \"I'm doing pretty good. I did some mountain biking yesterday. I had one spot that I feel and hit my knee but it feels Ok. I kind of bruised the side of my knee.\"     Current pain: 0/10; Range: 0-2/10    Objective:  AROM: right knee: 0 - 148    Treatment:  Rec bike x 6 min  SLS with balance foam x 30 sec x 3  SLS with contralateral LE reaching x 10   SLS with hip hinge x 10 x 3  SLS with diagonal ball lift x 10 x 3  Lateral step lunge x 10 x   SLS with contralateral hip abd isometric x 30 sec x 3   Total gym leg press with angle 7 R/L 3 x 15    OP Education: HEP: reviewed     Charges: TE x 3      Diagnosis:  1. Complex tear of medial meniscus of right knee, subsequent encounter    2. Complex tear of lateral meniscus of right knee, subsequent encounter        Assessment:   Pt demonstrates improved knee ROM and activity tolerance.     Plan:  Continue with focus on progressing WB activity and LE strengthening as tolerated.    Brenton Franco, PT  "

## 2025-07-21 ENCOUNTER — APPOINTMENT (OUTPATIENT)
Dept: PHYSICAL THERAPY | Facility: CLINIC | Age: 40
End: 2025-07-21
Payer: COMMERCIAL

## 2025-07-21 DIAGNOSIS — S83.231D COMPLEX TEAR OF MEDIAL MENISCUS OF RIGHT KNEE, SUBSEQUENT ENCOUNTER: ICD-10-CM

## 2025-07-21 DIAGNOSIS — S83.271D COMPLEX TEAR OF LATERAL MENISCUS OF RIGHT KNEE, SUBSEQUENT ENCOUNTER: ICD-10-CM

## 2025-07-21 PROCEDURE — 97110 THERAPEUTIC EXERCISES: CPT | Mod: GP

## 2025-07-21 NOTE — PROGRESS NOTES
"Physical Therapy    Physical Therapy Treatment    Patient Name: Ravi Subramanian  MRN: [id number]  Today's Date: 7/21/2025  Time Calculation  Start Time: 1428  Stop Time: 1508  Time Calculation (min): 40 min        Insurance:  Visit: #5  Authorized: 60  Payor: [employer] / Plan: [employer] / Product Type: *No Product type* /     Subjective:  \"I'm feeling pretty good for the most part. I played 9 holes on golf on Friday and I would feel it a little when squatting to read a putt. Otherwise I'm doing pretty well.\"     Current pain: 0/10; Range: 0-2/10    Objective:  AROM: right knee: 0 - 148    Treatment:  Rec bike x 6 min  SLS with balance foam x 30 sec x 3  SLS with contralateral LE reaching x 10   SLS with hip hinge x 10 x 3  SLS with diagonal ball lift x 10 x 3  Lateral step lunge x 10 x 2  Standing T squat x 10   Standing split squat x 10  Total gym leg press with angle 7 R/L 3 x 15    OP Education: HEP: reviewed     Charges: TE x 3      Diagnosis:  1. Complex tear of medial meniscus of right knee, subsequent encounter    2. Complex tear of lateral meniscus of right knee, subsequent encounter        Assessment:   Pt demonstrates improved WB exercise/activity tolerance.     Plan:  Continue with focus on progressing WB activity and LE strengthening as tolerated; follow up in 2 weeks.  Advance plyometric activity as tolerated.     Brenton Franco, PT  "

## 2025-07-26 DIAGNOSIS — L70.9 ACNE, UNSPECIFIED ACNE TYPE: ICD-10-CM

## 2025-07-28 RX ORDER — MINOCYCLINE HYDROCHLORIDE 100 MG/1
100 CAPSULE ORAL 2 TIMES DAILY PRN
Qty: 60 CAPSULE | Refills: 1 | Status: SHIPPED | OUTPATIENT
Start: 2025-07-28

## 2025-08-19 ENCOUNTER — TREATMENT (OUTPATIENT)
Dept: PHYSICAL THERAPY | Facility: CLINIC | Age: 40
End: 2025-08-19
Payer: COMMERCIAL

## 2025-08-19 DIAGNOSIS — S83.231D COMPLEX TEAR OF MEDIAL MENISCUS OF RIGHT KNEE, SUBSEQUENT ENCOUNTER: ICD-10-CM

## 2025-08-19 DIAGNOSIS — S83.271D COMPLEX TEAR OF LATERAL MENISCUS OF RIGHT KNEE, SUBSEQUENT ENCOUNTER: ICD-10-CM

## 2025-08-19 PROCEDURE — 97110 THERAPEUTIC EXERCISES: CPT | Mod: GP

## 2025-09-30 ENCOUNTER — APPOINTMENT (OUTPATIENT)
Dept: DERMATOLOGY | Facility: CLINIC | Age: 40
End: 2025-09-30
Payer: COMMERCIAL

## (undated) DEVICE — Device

## (undated) DEVICE — GLOVE, SURGICAL, PROTEXIS PI W/NEU-THERA, 8.5, PF, LF

## (undated) DEVICE — SYRINGE, 10 CC, LUER LOCK

## (undated) DEVICE — APPLICATOR, CHLORAPREP, W/ORANGE TINT, 26ML

## (undated) DEVICE — BANDAGE, ESMARK, 6 IN X 9 FT, STERILE

## (undated) DEVICE — DRAPE, SHEET, U, IMPERVIOUS, STERI DRAPE, 47 X 70 IN, DISPOSABLE, PLASTIC, STERILE

## (undated) DEVICE — SUTURE, ETHILON, 3-0, 18 IN, PS2, BLACK

## (undated) DEVICE — TUBING, DUAL WAVE, OUTFLOW

## (undated) DEVICE — DRAPE, SHEET, THREE QUARTER, FAN FOLD, 57 X 77 IN

## (undated) DEVICE — SUTURE, VICRYL PLUS, 2-0, SH, 1X27IN, UNDYED

## (undated) DEVICE — TOWEL, SURGICAL, NEURO, O/R, 16 X 26, BLUE, STERILE

## (undated) DEVICE — GLOVE, SURGICAL, PROTEXIS PI W/NEU-THERA, 8.0, PF, LF

## (undated) DEVICE — MAT, FLOOR, SURGISAFE, FLUID CONTROL, 46X40

## (undated) DEVICE — COVER HANDLE LIGHT, STERIS, BLUE, STERILE

## (undated) DEVICE — TUBING, PATIENT 8FT STERILE

## (undated) DEVICE — KIT, MINOR, DOUBLE BASIN

## (undated) DEVICE — TIP, SUCTION, YANKAUER, FLEXIBLE

## (undated) DEVICE — GLOVE, SURGICAL, PROTEXIS PI MICRO, 8.0, PF, LF

## (undated) DEVICE — TUBING, PUMP REDEUCE 8FT STERILE

## (undated) DEVICE — DISSECTOR, CURVED, 4.0 X 13

## (undated) DEVICE — CUFF, TOURNIQUET, 30 X 4, DUAL PORT/SNGL BLADDER, DISP, LF

## (undated) DEVICE — PROBE, APOLLO RF, 50 DEG, MULTI PORT